# Patient Record
Sex: FEMALE | Race: BLACK OR AFRICAN AMERICAN | NOT HISPANIC OR LATINO | Employment: OTHER | URBAN - METROPOLITAN AREA
[De-identification: names, ages, dates, MRNs, and addresses within clinical notes are randomized per-mention and may not be internally consistent; named-entity substitution may affect disease eponyms.]

---

## 2023-08-21 PROBLEM — G47.10 HYPERSOMNIA: Status: ACTIVE | Noted: 2023-08-21

## 2023-08-21 PROBLEM — G47.9 SLEEP DISORDER: Status: ACTIVE | Noted: 2023-08-21

## 2023-08-21 PROBLEM — R55 SYNCOPE AND COLLAPSE: Status: ACTIVE | Noted: 2023-08-21

## 2023-08-21 PROBLEM — R00.2 HEART PALPITATIONS: Status: ACTIVE | Noted: 2023-08-21

## 2023-08-21 PROBLEM — M54.16 LUMBAR RADICULOPATHY, ACUTE: Status: ACTIVE | Noted: 2023-08-21

## 2023-08-21 PROBLEM — M79.7 FIBROMYALGIA: Status: ACTIVE | Noted: 2023-08-21

## 2023-08-21 PROBLEM — S39.012A STRAIN OF LUMBAR REGION: Status: ACTIVE | Noted: 2023-08-21

## 2023-08-21 PROBLEM — R53.82 CHRONIC FATIGUE: Status: ACTIVE | Noted: 2023-08-21

## 2023-08-21 PROBLEM — M25.50 ARTHRALGIA: Status: ACTIVE | Noted: 2023-08-21

## 2023-08-21 PROBLEM — N18.4 ANEMIA ASSOCIATED WITH STAGE 4 CHRONIC RENAL FAILURE (MULTI): Status: ACTIVE | Noted: 2023-08-21

## 2023-08-21 PROBLEM — I47.10 PAROXYSMAL SVT (SUPRAVENTRICULAR TACHYCARDIA) (CMS-HCC): Status: ACTIVE | Noted: 2023-08-21

## 2023-08-21 PROBLEM — G35 MULTIPLE SCLEROSIS (MULTI): Status: ACTIVE | Noted: 2023-08-21

## 2023-08-21 PROBLEM — S16.1XXA STRAIN OF NECK MUSCLE: Status: ACTIVE | Noted: 2023-08-21

## 2023-08-21 PROBLEM — R26.89 ANTALGIC GAIT: Status: ACTIVE | Noted: 2023-08-21

## 2023-08-21 PROBLEM — G47.33 OSA ON CPAP: Status: ACTIVE | Noted: 2023-08-21

## 2023-08-21 PROBLEM — D63.1 ANEMIA ASSOCIATED WITH STAGE 4 CHRONIC RENAL FAILURE (MULTI): Status: ACTIVE | Noted: 2023-08-21

## 2023-08-21 PROBLEM — R07.89 ATYPICAL CHEST PAIN: Status: ACTIVE | Noted: 2023-08-21

## 2023-08-21 PROBLEM — R79.89 TROPONIN LEVEL ELEVATED: Status: ACTIVE | Noted: 2023-08-21

## 2023-08-21 PROBLEM — I10 BENIGN ESSENTIAL HTN: Status: ACTIVE | Noted: 2023-08-21

## 2023-08-21 PROBLEM — G47.00 INSOMNIA: Status: ACTIVE | Noted: 2023-08-21

## 2023-08-21 RX ORDER — MULTIVITAMIN
1 TABLET ORAL DAILY
COMMUNITY
Start: 2018-01-19

## 2023-08-21 RX ORDER — DULOXETIN HYDROCHLORIDE 60 MG/1
60 CAPSULE, DELAYED RELEASE ORAL
COMMUNITY
Start: 2016-03-28

## 2023-08-21 RX ORDER — HYDROCORTISONE 25 MG/ML
LOTION TOPICAL
COMMUNITY
Start: 2022-04-28 | End: 2024-06-10 | Stop reason: WASHOUT

## 2023-08-21 RX ORDER — LIDOCAINE 50 MG/G
PATCH TOPICAL
COMMUNITY
Start: 2022-08-05

## 2023-08-21 RX ORDER — FLUTICASONE PROPIONATE 50 MCG
SPRAY, SUSPENSION (ML) NASAL
COMMUNITY
Start: 2016-03-28 | End: 2024-06-10 | Stop reason: SDUPTHER

## 2023-08-21 RX ORDER — BETHANECHOL CHLORIDE 50 MG/1
50 TABLET ORAL
COMMUNITY
Start: 2016-04-19

## 2023-08-21 RX ORDER — VALSARTAN 40 MG/1
1 TABLET ORAL 2 TIMES DAILY
COMMUNITY
Start: 2019-10-10

## 2023-08-21 RX ORDER — IBUPROFEN 600 MG/1
TABLET ORAL
COMMUNITY
Start: 2016-09-21

## 2023-08-21 RX ORDER — CLINDAMYCIN PHOSPHATE 10 UG/ML
1 LOTION TOPICAL 2 TIMES DAILY
COMMUNITY
Start: 2018-07-09

## 2023-08-21 RX ORDER — IBANDRONATE SODIUM 150 MG/1
1 TABLET, FILM COATED ORAL
COMMUNITY
Start: 2018-05-04 | End: 2023-09-13

## 2023-08-21 RX ORDER — DICLOFENAC SODIUM 1 MG/ML
SOLUTION/ DROPS OPHTHALMIC
COMMUNITY
Start: 2016-04-23 | End: 2024-06-10 | Stop reason: WASHOUT

## 2023-08-21 RX ORDER — CHLORTHALIDONE 25 MG/1
1 TABLET ORAL DAILY
COMMUNITY
Start: 2019-10-10 | End: 2023-11-14

## 2023-08-21 RX ORDER — CICLOPIROX 80 MG/ML
SOLUTION TOPICAL
COMMUNITY
Start: 2022-08-02

## 2023-08-21 RX ORDER — CYCLOSPORINE 0.5 MG/ML
1 EMULSION OPHTHALMIC 2 TIMES DAILY
COMMUNITY

## 2023-08-21 RX ORDER — ATORVASTATIN CALCIUM 10 MG/1
10 TABLET, FILM COATED ORAL
COMMUNITY
Start: 2016-06-20

## 2023-08-21 RX ORDER — HYDROXYZINE HYDROCHLORIDE 25 MG/1
TABLET, FILM COATED ORAL
COMMUNITY
Start: 2016-08-25

## 2023-08-21 RX ORDER — BACLOFEN 20 MG/1
1 TABLET ORAL 3 TIMES DAILY
COMMUNITY
Start: 2016-03-07 | End: 2024-04-10

## 2023-08-21 RX ORDER — DIMETHYL FUMARATE 240 MG/1
1 CAPSULE ORAL 2 TIMES DAILY
COMMUNITY
Start: 2020-11-25 | End: 2023-10-16 | Stop reason: SDUPTHER

## 2023-08-21 RX ORDER — GABAPENTIN 100 MG/1
1 CAPSULE ORAL 3 TIMES DAILY
COMMUNITY
Start: 2018-12-05

## 2023-08-21 RX ORDER — TRAZODONE HYDROCHLORIDE 50 MG/1
50 TABLET ORAL NIGHTLY PRN
COMMUNITY
Start: 2023-04-17 | End: 2023-11-13

## 2023-08-21 RX ORDER — TIZANIDINE 4 MG/1
1 TABLET ORAL 3 TIMES DAILY
COMMUNITY
Start: 2018-01-19

## 2023-08-21 RX ORDER — MULTIVITAMIN
1 TABLET ORAL DAILY
COMMUNITY
End: 2023-08-21 | Stop reason: SDUPTHER

## 2023-08-21 RX ORDER — ASPIRIN 81 MG/1
81 TABLET ORAL
COMMUNITY

## 2023-08-21 RX ORDER — PANTOPRAZOLE SODIUM 40 MG/1
40 TABLET, DELAYED RELEASE ORAL
COMMUNITY

## 2023-09-13 PROBLEM — L21.8 OTHER SEBORRHEIC DERMATITIS: Status: ACTIVE | Noted: 2023-06-12

## 2023-09-13 PROBLEM — L65.0 TELOGEN EFFLUVIUM: Status: ACTIVE | Noted: 2023-06-12

## 2023-09-13 PROBLEM — D48.5 NEOPLASM OF UNCERTAIN BEHAVIOR OF SKIN: Status: ACTIVE | Noted: 2023-06-12

## 2023-09-13 PROBLEM — R10.9 ABDOMINAL PAIN: Status: ACTIVE | Noted: 2023-09-13

## 2023-09-13 PROBLEM — L82.1 OTHER SEBORRHEIC KERATOSIS: Status: ACTIVE | Noted: 2023-06-12

## 2023-09-13 PROBLEM — D22.5 MELANOCYTIC NEVI OF TRUNK: Status: ACTIVE | Noted: 2023-06-12

## 2023-09-13 PROBLEM — Z77.29 CARBON MONOXIDE EXPOSURE: Status: ACTIVE | Noted: 2023-09-13

## 2023-09-13 PROBLEM — G89.29 CHRONIC BACK PAIN: Status: ACTIVE | Noted: 2023-09-13

## 2023-09-13 PROBLEM — M54.9 CHRONIC BACK PAIN: Status: ACTIVE | Noted: 2023-09-13

## 2023-09-13 PROBLEM — T81.43XA POSTOPERATIVE INTRA-ABDOMINAL ABSCESS: Status: ACTIVE | Noted: 2023-09-13

## 2023-09-13 PROBLEM — R06.02 SHORTNESS OF BREATH ON EXERTION: Status: ACTIVE | Noted: 2023-09-13

## 2023-09-13 PROBLEM — T07.XXXA CONTUSION OF MULTIPLE SITES: Status: ACTIVE | Noted: 2023-09-13

## 2023-09-13 PROBLEM — S92.401A FRACTURE OF PHALANX OF RIGHT GREAT TOE: Status: ACTIVE | Noted: 2023-09-13

## 2023-09-13 PROBLEM — H92.09 OTALGIA: Status: ACTIVE | Noted: 2023-09-13

## 2023-09-13 PROBLEM — K65.1 POSTOPERATIVE INTRA-ABDOMINAL ABSCESS: Status: ACTIVE | Noted: 2023-09-13

## 2023-09-13 PROBLEM — S09.90XA CLOSED HEAD INJURY: Status: ACTIVE | Noted: 2023-09-13

## 2023-09-13 PROBLEM — L03.113 CELLULITIS OF RIGHT ARM: Status: ACTIVE | Noted: 2023-09-13

## 2023-09-13 PROBLEM — R07.9 CHEST PAIN: Status: ACTIVE | Noted: 2017-08-15

## 2023-09-13 PROBLEM — J06.9 ACUTE UPPER RESPIRATORY INFECTION: Status: ACTIVE | Noted: 2023-09-13

## 2023-09-13 PROBLEM — L91.8 OTHER HYPERTROPHIC DISORDERS OF THE SKIN: Status: ACTIVE | Noted: 2023-06-12

## 2023-09-13 PROBLEM — J02.9 PHARYNGITIS: Status: ACTIVE | Noted: 2023-09-13

## 2023-09-13 PROBLEM — H66.91 RIGHT OTITIS MEDIA: Status: ACTIVE | Noted: 2023-09-13

## 2023-09-13 PROBLEM — L81.0 POSTINFLAMMATORY HYPERPIGMENTATION: Status: ACTIVE | Noted: 2023-06-12

## 2023-09-13 PROBLEM — S82.891A FRACTURE OF RIGHT ANKLE: Status: ACTIVE | Noted: 2023-09-13

## 2023-09-13 RX ORDER — MAG HYDROX/ALUMINUM HYD/SIMETH 200-200-20
SUSPENSION, ORAL (FINAL DOSE FORM) ORAL
COMMUNITY
Start: 2022-02-01

## 2023-09-13 RX ORDER — BIMATOPROST 3 UG/ML
1 SOLUTION TOPICAL
COMMUNITY
Start: 2023-06-12 | End: 2023-11-13 | Stop reason: SDUPTHER

## 2023-09-13 RX ORDER — ERGOCALCIFEROL 1.25 MG/1
1 CAPSULE ORAL
COMMUNITY

## 2023-09-13 RX ORDER — KETOCONAZOLE 20 MG/G
CREAM TOPICAL
COMMUNITY
Start: 2020-12-21 | End: 2023-11-13 | Stop reason: SDUPTHER

## 2023-09-13 RX ORDER — PROCHLORPERAZINE MALEATE 10 MG
1 TABLET ORAL EVERY 6 HOURS PRN
COMMUNITY
Start: 2022-02-15 | End: 2023-11-15 | Stop reason: ALTCHOICE

## 2023-09-13 RX ORDER — TRIAMCINOLONE ACETONIDE 1 MG/G
1 CREAM TOPICAL
COMMUNITY
Start: 2018-07-09 | End: 2024-06-10 | Stop reason: WASHOUT

## 2023-09-13 RX ORDER — ONDANSETRON 4 MG/1
4 TABLET, ORALLY DISINTEGRATING ORAL 3 TIMES DAILY PRN
COMMUNITY
Start: 2018-07-04 | End: 2023-11-15 | Stop reason: ALTCHOICE

## 2023-09-13 RX ORDER — POTASSIUM CHLORIDE 20 MEQ/1
1 TABLET, EXTENDED RELEASE ORAL DAILY
COMMUNITY
Start: 2022-05-23

## 2023-09-13 RX ORDER — MODAFINIL 100 MG/1
1 TABLET ORAL DAILY
COMMUNITY
Start: 2020-04-28 | End: 2024-06-10 | Stop reason: WASHOUT

## 2023-09-13 RX ORDER — MINOXIDIL 50 MG/G
1 AEROSOL, FOAM TOPICAL
COMMUNITY
Start: 2020-12-21 | End: 2023-11-13 | Stop reason: SDUPTHER

## 2023-09-13 RX ORDER — CAPSAICIN 0.75 MG/G
1 CREAM TOPICAL
COMMUNITY
Start: 2020-12-21

## 2023-09-13 RX ORDER — KETOCONAZOLE 20 MG/ML
1 SHAMPOO, SUSPENSION TOPICAL
COMMUNITY
Start: 2020-08-03 | End: 2023-11-13 | Stop reason: SDUPTHER

## 2023-09-13 RX ORDER — CYCLOSPORINE 0.5 MG/ML
1 EMULSION OPHTHALMIC 2 TIMES DAILY
COMMUNITY
Start: 2019-12-23 | End: 2023-11-15 | Stop reason: WASHOUT

## 2023-09-13 RX ORDER — SIMETHICONE 125 MG
1 TABLET,CHEWABLE ORAL EVERY 6 HOURS PRN
COMMUNITY
Start: 2022-03-23

## 2023-09-13 RX ORDER — DEXAMETHASONE 4 MG/1
4 TABLET ORAL 2 TIMES DAILY
COMMUNITY
Start: 2022-01-12 | End: 2024-06-10 | Stop reason: WASHOUT

## 2023-09-13 RX ORDER — LOPERAMIDE HYDROCHLORIDE 2 MG/1
CAPSULE ORAL
COMMUNITY
Start: 2022-02-15 | End: 2024-06-10 | Stop reason: WASHOUT

## 2023-09-13 RX ORDER — TRAMADOL HYDROCHLORIDE 50 MG/1
50 TABLET ORAL EVERY 8 HOURS PRN
COMMUNITY
Start: 2017-12-09

## 2023-09-13 RX ORDER — ONDANSETRON HYDROCHLORIDE 8 MG/1
8 TABLET, FILM COATED ORAL EVERY 8 HOURS PRN
COMMUNITY
Start: 2022-02-15 | End: 2023-11-15 | Stop reason: ALTCHOICE

## 2023-09-13 RX ORDER — LOSARTAN POTASSIUM 50 MG/1
50 TABLET ORAL 2 TIMES DAILY
COMMUNITY
End: 2023-10-17 | Stop reason: ALTCHOICE

## 2023-10-12 ENCOUNTER — HOSPITAL ENCOUNTER (OUTPATIENT)
Dept: RADIOLOGY | Facility: HOSPITAL | Age: 58
Discharge: HOME | End: 2023-10-12
Payer: MEDICARE

## 2023-10-12 DIAGNOSIS — R07.89 OTHER CHEST PAIN: ICD-10-CM

## 2023-10-12 PROCEDURE — 75571 CT HRT W/O DYE W/CA TEST: CPT

## 2023-10-16 ENCOUNTER — OFFICE VISIT (OUTPATIENT)
Dept: NEUROLOGY | Facility: CLINIC | Age: 58
End: 2023-10-16
Payer: MEDICARE

## 2023-10-16 VITALS
DIASTOLIC BLOOD PRESSURE: 73 MMHG | WEIGHT: 174 LBS | SYSTOLIC BLOOD PRESSURE: 112 MMHG | HEIGHT: 65 IN | TEMPERATURE: 97.1 F | BODY MASS INDEX: 28.99 KG/M2

## 2023-10-16 DIAGNOSIS — S39.012D STRAIN OF LUMBAR REGION, SUBSEQUENT ENCOUNTER: ICD-10-CM

## 2023-10-16 DIAGNOSIS — F51.01 PRIMARY INSOMNIA: ICD-10-CM

## 2023-10-16 DIAGNOSIS — T45.1X5A CHEMOTHERAPY-INDUCED NEUROPATHY (MULTI): ICD-10-CM

## 2023-10-16 DIAGNOSIS — G35 MULTIPLE SCLEROSIS (MULTI): Primary | ICD-10-CM

## 2023-10-16 DIAGNOSIS — G62.0 CHEMOTHERAPY-INDUCED NEUROPATHY (MULTI): ICD-10-CM

## 2023-10-16 DIAGNOSIS — G47.33 OSA ON CPAP: ICD-10-CM

## 2023-10-16 DIAGNOSIS — S16.1XXD STRAIN OF NECK MUSCLE, SUBSEQUENT ENCOUNTER: ICD-10-CM

## 2023-10-16 PROCEDURE — 3078F DIAST BP <80 MM HG: CPT | Performed by: PSYCHIATRY & NEUROLOGY

## 2023-10-16 PROCEDURE — 99214 OFFICE O/P EST MOD 30 MIN: CPT | Performed by: PSYCHIATRY & NEUROLOGY

## 2023-10-16 PROCEDURE — 1036F TOBACCO NON-USER: CPT | Performed by: PSYCHIATRY & NEUROLOGY

## 2023-10-16 PROCEDURE — 3074F SYST BP LT 130 MM HG: CPT | Performed by: PSYCHIATRY & NEUROLOGY

## 2023-10-16 RX ORDER — DIMETHYL FUMARATE 240 MG/1
240 CAPSULE ORAL 2 TIMES DAILY
Qty: 180 CAPSULE | Refills: 3 | Status: SHIPPED | OUTPATIENT
Start: 2023-10-16 | End: 2023-11-30 | Stop reason: SDUPTHER
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
## 2023-10-17 ENCOUNTER — OFFICE VISIT (OUTPATIENT)
Dept: CARDIOLOGY | Facility: CLINIC | Age: 58
End: 2023-10-17
Payer: MEDICARE

## 2023-10-17 VITALS
OXYGEN SATURATION: 98 % | WEIGHT: 170 LBS | HEIGHT: 64 IN | BODY MASS INDEX: 29.02 KG/M2 | DIASTOLIC BLOOD PRESSURE: 60 MMHG | SYSTOLIC BLOOD PRESSURE: 98 MMHG | HEART RATE: 80 BPM

## 2023-10-17 DIAGNOSIS — R06.02 SHORTNESS OF BREATH ON EXERTION: ICD-10-CM

## 2023-10-17 DIAGNOSIS — I10 BENIGN ESSENTIAL HTN: ICD-10-CM

## 2023-10-17 DIAGNOSIS — R07.9 CHEST PAIN, UNSPECIFIED TYPE: ICD-10-CM

## 2023-10-17 DIAGNOSIS — I47.10 PAROXYSMAL SVT (SUPRAVENTRICULAR TACHYCARDIA) (CMS-HCC): Primary | ICD-10-CM

## 2023-10-17 DIAGNOSIS — R00.2 HEART PALPITATIONS: ICD-10-CM

## 2023-10-17 PROCEDURE — 99214 OFFICE O/P EST MOD 30 MIN: CPT | Performed by: STUDENT IN AN ORGANIZED HEALTH CARE EDUCATION/TRAINING PROGRAM

## 2023-10-17 PROCEDURE — 1036F TOBACCO NON-USER: CPT | Performed by: STUDENT IN AN ORGANIZED HEALTH CARE EDUCATION/TRAINING PROGRAM

## 2023-10-17 PROCEDURE — 3074F SYST BP LT 130 MM HG: CPT | Performed by: STUDENT IN AN ORGANIZED HEALTH CARE EDUCATION/TRAINING PROGRAM

## 2023-10-17 PROCEDURE — 3078F DIAST BP <80 MM HG: CPT | Performed by: STUDENT IN AN ORGANIZED HEALTH CARE EDUCATION/TRAINING PROGRAM

## 2023-10-17 RX ORDER — IBANDRONATE SODIUM 150 MG/1
150 TABLET, FILM COATED ORAL
COMMUNITY

## 2023-10-17 NOTE — PATIENT INSTRUCTIONS
We will follow-up on your CT calcium score results.    We discussed that if your blood pressure continues to run low, I would consider holding or reducing chlorthalidone to half a tablet or 12.5 mg daily.    Please continue remaining cardiac medications including aspirin 81 mg, atorvastatin 10 mg daily, chlorthalidone 25 mg daily, valsartan 40 mg twice daily, propranolol 80 mg daily.    Please followup with me in Cardiology clinic within the next 9 months.  Please return to clinic sooner or seek emergent care if your symptoms reoccur or worsen.

## 2023-10-17 NOTE — PROGRESS NOTES
Follow-up (6 mo)     HPI:    Genny Grant is a 58 y.o. female with pertinent history of family history of premature coronary artery disease, paroxysmal supraventricular tachycardia, palpitations, hypertension, dyslipidemia, multiple sclerosis, breast cancer with recent chemotherapy and upcoming radiation, history of syncopal episode, low normal ejection fraction with impaired relaxation on echo performed 9/17/2021, no clear ischemia on nuclear stress test performed 8/15/2017 or 1/31/2020, coronary calcium score of 0.4 9/23/2021, preserved ejection fraction with impaired relaxation echo performed 10/19/2022, no clear ischemia nuclear stress test performed 3/15/2023 presents for follow-up.    She is doing relatively well.  She does note some occasional dizziness.  Her blood pressure was a bit low today.  She continues to have some sporadic nonexertional chest discomfort.  No exacerbating or relieving factors.  Pt denies orthopnea, and paroxysmal nocturnal dyspnea.  Pt denies worsening lower extremity edema.  Pt denies palpitations or syncope.  No recent falls.  No fever or chills.  No cough.  No change in bowel or bladder habits.  No sick contacts.  No recent travel.    12 point review of systems including (Constitutional, Eyes, ENMT, Respiratory, Cardiac, Gastrointestinal, Neurological, Psychiatric, and Hematologic) was performed and is otherwise negative.    Past medical history reviewed:   has a past medical history of Hypersomnia, unspecified (09/10/2019), Multiple sclerosis (CMS/HCC), Personal history of other endocrine, nutritional and metabolic disease, and Personal history of other specified conditions (01/04/2018).    Past surgical history reviewed    Social history reviewed:   reports that she has never smoked. She has never used smokeless tobacco. She reports that she does not drink alcohol. No history on file for drug use.     Family history reviewed:    Family History   Problem Relation Name Age  of Onset    Alzheimer's disease Mother      Other (Malignant neoplasm) Mother      Other (Malignant neoplasm) Father         Allergies reviewed: Adhesive, Diphenhydramine, and Penicillins     Medications reviewed:   Current Outpatient Medications   Medication Instructions    aspirin 81 mg, oral    atorvastatin (LIPITOR) 10 mg, oral    baclofen (Lioresal) 20 mg tablet 1 tablet, oral, 3 times daily    bethanechol (URECHOLINE) 50 mg, oral    bimatoprost (Latisse) 0.03 % ophthalmic solution 1 Application, DO PER DIRECTED    capsicum (Zostrix) 0.075 % topical cream 1 Application, Topical, PER DIRECTED    chlorthalidone (Hygroton) 25 mg tablet 1 tablet, oral, Daily    ciclopirox (Penlac) 8 % solution APPLY TO AFFECTED AREA DAILY AT BEDTIME. REMOVE FILM FROM MEDICATION ONCE WEEKLY WITH ALCOHOL SWAB    clindamycin (Cleocin T) 1 % lotion 1 Application, Topical, 2 times daily, apply 1 application twice a day topically to red bumps on skin    cycloSPORINE (Restasis MultiDose) 0.05 % drops 1 drop, Both Eyes, 2 times daily    cycloSPORINE (Restasis) 0.05 % ophthalmic emulsion 1 drop, ophthalmic (eye), 2 times daily    dexAMETHasone (DECADRON) 4 mg, oral, 2 times daily, TAKE 2 TAB TWICE DAILY FOR 3 DAYS STARTING 1 DAY PRIOR TO TAXOTERE  PER DIRECTED    diclofenac (Voltaren) 0.1 % ophthalmic solution ophthalmic (eye)    dimethyl fumarate (TECFIDERA) 240 mg, oral, 2 times daily    DULoxetine (CYMBALTA) 60 mg, oral    ergocalciferol (Vitamin D-2) 1.25 MG (43706 UT) capsule 1 capsule, oral, Weekly    fluticasone (Flonase) 50 mcg/actuation nasal spray nasal    gabapentin (Neurontin) 100 mg capsule 1 capsule, oral, 3 times daily    hydrocortisone 1 % ointment Topical, DO PER DIRECTED    hydrocortisone 2.5 % lotion Topical    hydrOXYzine HCL (Atarax) 25 mg tablet oral    ibuprofen 600 mg tablet oral    ketoconazole (NIZOral) 2 % cream Topical, DO PER DIRECTED    ketoconazole (NIZOral) 2 % shampoo 1 Application, Topical, PER DIRECTED     lidocaine (Lidoderm) 5 % patch APPLY 1 PATCH TO INTACT SKIN REMOVE AFTER 12 HOURS EXTERNALLY ONCE A DAY FOR 30 DAYS    loperamide (Imodium) 2 mg capsule oral, SEE DETAILS ATTACHED    losartan (COZAAR) 50 mg, oral, 2 times daily    minoxidiL 5 % foam 1 Application, Topical, DO PER DIRECTED    modafinil (Provigil) 100 mg tablet 1 tablet, oral, Daily    multivitamin tablet 1 tablet, oral, Daily    ondansetron (ZOFRAN) 8 mg, oral, Every 8 hours PRN    ondansetron ODT (ZOFRAN-ODT) 4 mg, oral, 3 times daily PRN, PER DIRECTED    pantoprazole (PROTONIX) 40 mg, oral    potassium chloride CR (Klor-Con M20) 20 mEq ER tablet 1 tablet, oral, Daily    prochlorperazine (Compazine) 10 mg tablet 1 tablet, oral, Every 6 hours PRN    propranolol XL (Innopran XL) 80 mg 24 hr capsule 1 capsule, oral, Daily    simethicone (Mylicon) 125 mg chewable tablet 1 tablet, oral, Every 6 hours PRN    tiZANidine (Zanaflex) 4 mg tablet 1 tablet, oral, 3 times daily, TAKE 1 TABLET BY MOUTH 3 TIMES DAILY AS NEEDED FOR BACK AND LEG CRAMPS    traMADol (ULTRAM) 50 mg, oral, Every 8 hours PRN    traZODone (DESYREL) 50 mg, oral, Nightly PRN, TAKE 1 TABLET AT BEDTIME AS NEEDED FOR SLEEP. IF NO HELP IN 2 DAYS, TAKE 2 AT BEDTIME    triamcinolone (Kenalog) 0.1 % cream 1 Application, Topical, PER DIRECTED    valsartan (Diovan) 40 mg tablet 1 tablet, oral, 2 times daily        Vitals reviewed: Blood pressure 98/60 mmHg    Heart rate 80 bpm, pulse ox 98%.    Physical Exam:   General:  Patient is awake, alert, and oriented.  Patient is in no acute distress.  HEENT:  Pupils equal and reactive.  Normocephalic.  Moist mucosa.    Neck:  No thyromegaly.  Normal Jugular Venous Pressure.  Cardiovascular:  Regular rate and rhythm.  Normal S1 and S2.  1/6 SCARLET.  Pulmonary:  Clear to auscultation bilaterally.  Abdomen:  Soft. Non-tender.   Non-distended.  Positive bowel sounds.  Lower Extremities:  2+ pedal pulses. No LE edema.  Neurologic:  Cranial nerves intact.  No focal  deficit.   Skin: Skin warm and dry, normal skin turgor.   Psychiatric: Normal affect.    Last Labs:  CBC -      Lab Results   Component Value Date    WBC 6.3 11/18/2021    HGB 13.8 11/18/2021    HCT 41.1 11/18/2021     11/18/2021        CMP-  Lab Results   Component Value Date    GLUCOSE 101 (H) 06/22/2022     06/22/2022    K 3.7 06/22/2022    CL 99 06/22/2022    CO2 32 06/22/2022    ANIONGAP 11 06/22/2022    BUN 16 06/22/2022    CREATININE 0.69 06/22/2022    CALCIUM 9.8 06/22/2022    PROT 7.3 06/22/2022    ALBUMIN 4.2 06/22/2022    AST 13 06/22/2022    ALT 18 06/22/2022    ALKPHOS 83 06/22/2022    BILITOT 1.3 (H) 06/22/2022        LIPIDS-  Lab Results   Component Value Date    CHOL 143 11/18/2021    TRIG 75 11/18/2021    HDL 45.1 11/18/2021    CHHDL 3.2 11/18/2021    VLDL 15 11/18/2021        OTHERS-  Lab Results   Component Value Date    HGBA1C 6.1 (H) 05/26/2022        I personally reviewed the patient's recent vitals, labs, medications, orders, EKGs, pertinent cardiac imaging/ echocardiography and ischemic evaluations including stress testing/ cardiac catheterization.    Assessment and Plan:    Genny Grant is a 58 y.o. female with pertinent history of family history of premature coronary artery disease, paroxysmal supraventricular tachycardia, palpitations, hypertension, dyslipidemia, multiple sclerosis, breast cancer with recent chemotherapy and upcoming radiation, history of syncopal episode, low normal ejection fraction with impaired relaxation on echo performed 9/17/2021, no clear ischemia on nuclear stress test performed 8/15/2017 or 1/31/2020, coronary calcium score of 0.4 9/23/2021, preserved ejection fraction with impaired relaxation echo performed 10/19/2022, no clear ischemia nuclear stress test performed 3/15/2023 presents for follow-up.  She is doing relatively well.  She does note some occasional dizziness.  Her blood pressure was a bit low today.  She continues to have some  sporadic nonexertional chest discomfort.      We will follow-up on your CT calcium score results.    We discussed that if your blood pressure continues to run low, I would consider holding or reducing chlorthalidone to half a tablet or 12.5 mg daily.    Please continue remaining cardiac medications including aspirin 81 mg, atorvastatin 10 mg daily, chlorthalidone 25 mg daily, valsartan 40 mg twice daily, propranolol 80 mg daily.    Please followup with me in Cardiology clinic within the next 9 months.  Please return to clinic sooner or seek emergent care if your symptoms reoccur or worsen.    Thank you for allowing me to participate in their care.  Please feel free to call me with any further questions or concerns.        Amrit Campbell MD, FAC, [facility]  Division of Cardiovascular Medicine  Medical Director, [facility]  [facility]  Assistant Clinical Professor, Medicine  [employer] [facility] [employer]  [email or web address]  Office:  [phone]

## 2023-11-06 ENCOUNTER — ANCILLARY PROCEDURE (OUTPATIENT)
Dept: RADIOLOGY | Facility: CLINIC | Age: 58
End: 2023-11-06
Payer: MEDICARE

## 2023-11-06 ENCOUNTER — TELEPHONE (OUTPATIENT)
Dept: NEUROLOGY | Facility: CLINIC | Age: 58
End: 2023-11-06
Payer: MEDICARE

## 2023-11-06 DIAGNOSIS — G35 MULTIPLE SCLEROSIS (MULTI): Primary | ICD-10-CM

## 2023-11-06 DIAGNOSIS — G35 MULTIPLE SCLEROSIS (MULTI): ICD-10-CM

## 2023-11-06 PROCEDURE — 70553 MRI BRAIN STEM W/O & W/DYE: CPT | Performed by: RADIOLOGY

## 2023-11-06 PROCEDURE — 70553 MRI BRAIN STEM W/O & W/DYE: CPT

## 2023-11-06 PROCEDURE — 2550000001 HC RX 255 CONTRASTS: Performed by: PSYCHIATRY & NEUROLOGY

## 2023-11-06 PROCEDURE — A9575 INJ GADOTERATE MEGLUMI 0.1ML: HCPCS | Performed by: PSYCHIATRY & NEUROLOGY

## 2023-11-06 RX ORDER — GADOTERATE MEGLUMINE 376.9 MG/ML
15 INJECTION INTRAVENOUS
Status: COMPLETED | OUTPATIENT
Start: 2023-11-06 | End: 2023-11-06

## 2023-11-06 RX ADMIN — GADOTERATE MEGLUMINE 15 ML: 376.9 INJECTION INTRAVENOUS at 11:59

## 2023-11-08 ENCOUNTER — HOME HEALTH ADMISSION (OUTPATIENT)
Dept: HOME HEALTH SERVICES | Facility: HOME HEALTH | Age: 58
End: 2023-11-08
Payer: MEDICARE

## 2023-11-08 ENCOUNTER — HOME INFUSION (OUTPATIENT)
Dept: INFUSION THERAPY | Age: 58
End: 2023-11-08
Payer: MEDICARE

## 2023-11-08 DIAGNOSIS — G35 MULTIPLE SCLEROSIS (MULTI): Primary | ICD-10-CM

## 2023-11-08 RX ORDER — METHYLPREDNISOLONE SODIUM SUCCINATE 1 G/16ML
1000 INJECTION INTRAMUSCULAR; INTRAVENOUS DAILY
Qty: 3000 MG | Refills: 0 | Status: SHIPPED
Start: 2023-11-08 | End: 2024-06-10 | Stop reason: WASHOUT

## 2023-11-09 ENCOUNTER — HOME INFUSION (OUTPATIENT)
Dept: INFUSION THERAPY | Age: 58
End: 2023-11-09
Payer: MEDICARE

## 2023-11-09 NOTE — PROGRESS NOTES
REVIEWED PT INFO AS CORRECT.   DX...  MS  REVIEWED ALLERGIES...   DIPENHYDRAMINE, PCN. ADHESIVE  NO MEDICATION INTERACTIONS.  REVIEWED RELEVANT BASELINE VALUES  LAB ARE ...none ordered  PT HAS **peripheral infusion**FLUSH PER [facility] PROTOCOL.  CONTINUE MED THRU TENTATIVE STOP DATE …. DOSING …METHYLPRED 1GMIV DAILY X3 CONSECUTIVE DAYS - 11.13-11.15  MED PLACED …gravity bag, homemix  CARE PLAN DONE TODAY    Patient is a    57yo patient coming on service with [facility] for treatment of MS flare....patient has been ordered IV methyprednisolone 1000 mg q24h x3 days by Dr Youngblood   Patient will receive this infusion peripherally    Pt rep to contact pt to set up delivery  Sending straight delivery …with all supplies on fridy 11.10.23  3x Methyprednisolone 1 gm vials    3x Bacteriostatic water vials   3x Nacl 0.9% 250 ml infusion bags  DOS….11.13- 11.15      No follow up. Next delivery pending orders/ needs

## 2023-11-10 ENCOUNTER — DOCUMENTATION (OUTPATIENT)
Dept: PHARMACY | Facility: CLINIC | Age: 58
End: 2023-11-10

## 2023-11-10 NOTE — PROGRESS NOTES
CALLED PT AND LEFT VM - DELIVERY IS SCHEDULED FOR TODAY BY  9 PM.  LET PT KNOW WE'RE SENDING STND SUPPLIES AND ASKED TO CALL W/ ANY QUESTIONS.

## 2023-11-11 DIAGNOSIS — I10 BENIGN ESSENTIAL HTN: Primary | ICD-10-CM

## 2023-11-11 DIAGNOSIS — F51.01 PRIMARY INSOMNIA: Primary | ICD-10-CM

## 2023-11-13 ENCOUNTER — OFFICE VISIT (OUTPATIENT)
Dept: DERMATOLOGY | Facility: HOSPITAL | Age: 58
End: 2023-11-13
Payer: MEDICARE

## 2023-11-13 ENCOUNTER — HOME CARE VISIT (OUTPATIENT)
Dept: HOME HEALTH SERVICES | Facility: HOME HEALTH | Age: 58
End: 2023-11-13
Payer: MEDICARE

## 2023-11-13 VITALS — WEIGHT: 72 LBS | RESPIRATION RATE: 16 BRPM | HEIGHT: 65 IN | BODY MASS INDEX: 12 KG/M2

## 2023-11-13 DIAGNOSIS — B02.29 POST HERPETIC NEURALGIA: ICD-10-CM

## 2023-11-13 DIAGNOSIS — L81.0 POST-INFLAMMATORY HYPERPIGMENTATION: ICD-10-CM

## 2023-11-13 DIAGNOSIS — L65.8 FEMALE PATTERN ALOPECIA: ICD-10-CM

## 2023-11-13 DIAGNOSIS — L21.9 SEBORRHEIC DERMATITIS: Primary | ICD-10-CM

## 2023-11-13 DIAGNOSIS — L91.8 SKIN TAG: ICD-10-CM

## 2023-11-13 DIAGNOSIS — L65.9 ALOPECIA: ICD-10-CM

## 2023-11-13 DIAGNOSIS — R20.9 DISTURBANCE OF SKIN SENSATION: ICD-10-CM

## 2023-11-13 PROBLEM — L82.1 OTHER SEBORRHEIC KERATOSIS: Status: RESOLVED | Noted: 2023-06-12 | Resolved: 2023-11-13

## 2023-11-13 PROBLEM — D22.5 MELANOCYTIC NEVI OF TRUNK: Status: RESOLVED | Noted: 2023-06-12 | Resolved: 2023-11-13

## 2023-11-13 PROCEDURE — 1090000002 HH PPS REVENUE DEBIT

## 2023-11-13 PROCEDURE — 0023 HH SOC

## 2023-11-13 PROCEDURE — 3074F SYST BP LT 130 MM HG: CPT | Performed by: DERMATOLOGY

## 2023-11-13 PROCEDURE — 169592 NO-PAY CLAIM PROCEDURE

## 2023-11-13 PROCEDURE — 1036F TOBACCO NON-USER: CPT | Performed by: DERMATOLOGY

## 2023-11-13 PROCEDURE — G0299 HHS/HOSPICE OF RN EA 15 MIN: HCPCS | Mod: HHH

## 2023-11-13 PROCEDURE — 99214 OFFICE O/P EST MOD 30 MIN: CPT | Mod: 25 | Performed by: DERMATOLOGY

## 2023-11-13 PROCEDURE — 99602 HOME NFS VISIT EACH ADDL HR: CPT | Mod: HHH

## 2023-11-13 PROCEDURE — 99214 OFFICE O/P EST MOD 30 MIN: CPT | Performed by: DERMATOLOGY

## 2023-11-13 PROCEDURE — 3078F DIAST BP <80 MM HG: CPT | Performed by: DERMATOLOGY

## 2023-11-13 PROCEDURE — 1090000001 HH PPS REVENUE CREDIT

## 2023-11-13 RX ORDER — MINOXIDIL 50 MG/G
1 AEROSOL, FOAM TOPICAL DAILY
Qty: 60 G | Refills: 11 | Status: SHIPPED | OUTPATIENT
Start: 2023-11-13 | End: 2024-06-10 | Stop reason: WASHOUT

## 2023-11-13 RX ORDER — BIMATOPROST 3 UG/ML
SOLUTION TOPICAL
Qty: 5 ML | Refills: 3 | Status: SHIPPED | OUTPATIENT
Start: 2023-11-13

## 2023-11-13 RX ORDER — TRAZODONE HYDROCHLORIDE 50 MG/1
100 TABLET ORAL NIGHTLY
Qty: 60 TABLET | Refills: 5 | Status: SHIPPED | OUTPATIENT
Start: 2023-11-13 | End: 2024-06-10

## 2023-11-13 RX ORDER — TRETINOIN 0.25 MG/G
CREAM TOPICAL NIGHTLY
Qty: 45 G | Refills: 3 | Status: SHIPPED | OUTPATIENT
Start: 2023-11-13 | End: 2024-06-10 | Stop reason: WASHOUT

## 2023-11-13 RX ORDER — KETOCONAZOLE 20 MG/ML
SHAMPOO, SUSPENSION TOPICAL
Qty: 120 ML | Refills: 11 | Status: SHIPPED | OUTPATIENT
Start: 2023-11-13 | End: 2024-02-12 | Stop reason: SDUPTHER

## 2023-11-13 ASSESSMENT — DERMATOLOGY PATIENT ASSESSMENT
ARE YOU TRYING TO GET PREGNANT: NO
HAVE YOU HAD OR DO YOU HAVE A STAPH INFECTION: NO
ARE YOU AN ORGAN TRANSPLANT RECIPIENT: NO
DO YOU USE A TANNING BED: NO
HAVE YOU HAD OR DO YOU HAVE VASCULAR DISEASE: NO
ARE YOU ON BIRTH CONTROL: NO
DO YOU HAVE ANY NEW OR CHANGING LESIONS: NO
DO YOU HAVE IRREGULAR MENSTRUAL CYCLES: NO

## 2023-11-13 ASSESSMENT — PAIN SCALES - PAIN ASSESSMENT IN ADVANCED DEMENTIA (PAINAD)
BODYLANGUAGE: 0
NEGVOCALIZATION: 0 - NONE.
BREATHING: 0
BODYLANGUAGE: 0 - RELAXED.
CONSOLABILITY: 0
FACIALEXPRESSION: 0 - SMILING OR INEXPRESSIVE.
NEGVOCALIZATION: 0
TOTALSCORE: 0
FACIALEXPRESSION: 0
CONSOLABILITY: 0 - NO NEED TO CONSOLE.

## 2023-11-13 ASSESSMENT — DERMATOLOGY QUALITY OF LIFE (QOL) ASSESSMENT
RATE HOW BOTHERED YOU ARE BY EFFECTS OF YOUR SKIN PROBLEMS ON YOUR ACTIVITIES (EG, GOING OUT, ACCOMPLISHING WHAT YOU WANT, WORK ACTIVITIES OR YOUR RELATIONSHIPS WITH OTHERS): 0 - NEVER BOTHERED
RATE HOW EMOTIONALLY BOTHERED YOU ARE BY YOUR SKIN PROBLEM (FOR EXAMPLE, WORRY, EMBARRASSMENT, FRUSTRATION): 0 - NEVER BOTHERED
DATE THE QUALITY-OF-LIFE ASSESSMENT WAS COMPLETED: 66791
ARE THERE EXCLUSIONS OR EXCEPTIONS FOR THE QUALITY OF LIFE ASSESSMENT: NO
RATE HOW BOTHERED YOU ARE BY SYMPTOMS OF YOUR SKIN PROBLEM (EG, ITCHING, STINGING BURNING, HURTING OR SKIN IRRITATION): 0 - NEVER BOTHERED

## 2023-11-13 ASSESSMENT — PATIENT GLOBAL ASSESSMENT (PGA): PATIENT GLOBAL ASSESSMENT: PATIENT GLOBAL ASSESSMENT:  1 - CLEAR

## 2023-11-13 ASSESSMENT — ENCOUNTER SYMPTOMS
HIGHEST PAIN SEVERITY IN PAST 24 HOURS: 3/10
LOSS OF SENSATION IN FEET: 1
PAIN: 1
OCCASIONAL FEELINGS OF UNSTEADINESS: 1
APPETITE LEVEL: GOOD
PAIN SEVERITY GOAL: 0/10
LOWEST PAIN SEVERITY IN PAST 24 HOURS: 2/10
LAST BOWEL MOVEMENT: 66789

## 2023-11-13 ASSESSMENT — ITCH NUMERIC RATING SCALE: HOW SEVERE IS YOUR ITCHING?: 2

## 2023-11-13 NOTE — PROGRESS NOTES
Subjective     Genny Grant is a 58 y.o. female who presents for the following: Alopecia.     Review of Systems:  No other skin or systemic complaints other than what is documented elsewhere in the note.    The following portions of the chart were reviewed this encounter and updated as appropriate:   Tobacco  Allergies  Meds  Problems  Med Hx  Surg Hx         Skin Cancer History  No skin cancer on file.      Specialty Problems          Dermatology Problems    Neoplasm of uncertain behavior of skin    Other seborrheic dermatitis    Telogen effluvium    Contusion of multiple sites        Objective   Well appearing patient in no apparent distress; mood and affect are within normal limits.    A focused skin examination was performed. All findings within normal limits unless otherwise noted below.    Assessment/Plan   1. Seborrheic dermatitis  Scalp  No scale on exam    Needs refills of ketoconazole shampoo    Related Procedures  Follow Up In Dermatology - Established Patient    2. Female pattern alopecia  Scalp  Diffuse decreased density over crown of scalp. Pull test normal, no erythmea          Treat seb derm as recommended, this will minimize inflammation and make it easier for hair to grow    Recommend minoxidil 5% foam    ketoconazole (NIZOral) 2 % shampoo - Scalp  Apply topically 1 (one) time per week.    minoxidiL 5 % foam - Scalp  Apply 1 Application topically once daily.    3. Post-inflammatory hyperpigmentation  Head - Anterior (Face)  Brown patches, irregular, on face    Did not get tretinoin from last visit. Print rx today. Warned that she may need to use GoodRx coupon    tretinoin (Retin-A) 0.025 % cream - Head - Anterior (Face)  Apply topically once daily at bedtime. A pea-sized amount to the whole face, start every 2-3 nights and gradually increase to nightly    4. Alopecia  Left Eyebrow  Decreased hair density of eyebrows    Noticed after chemotherapy; not yet at full thickness, may be  age-related changes as well    She did not receive bimatoprost when last prescribed; warned that she will likely have to pay out of pocket    Related Medications  bimatoprost (Latisse) 0.03 % ophthalmic solution  Apply daily to areas of decreased hair on eyebrows    5. Skin tag  Neck - Anterior  Flesh colored pedunculated papule(s); x10 around neck. They cause pain    -Discussed nature of the condition  -Reassurance  -Removal may be performed   - Will check with insurance to see if covered    Prior Authorization for Skin Excision - Skin Tag Removal - Neck - Anterior    6. Disturbance of skin sensation (3)  Neck - Anterior, Scalp (2)  No skin lesions    Associated with skin tags    Related Procedures  Prior Authorization for Skin Excision - Skin Tag Removal    7. Post herpetic neuralgia  Scalp  No skin lesions    At last visit Rx capsaicin cream, she cannot recall if she received it or not    Continue gabapentin per neurology        Follow up 3 months for hair/pigmentation  Follow up for skin tag removal - wait for PA to determine how to schedule

## 2023-11-13 NOTE — PATIENT INSTRUCTIONS
Minoxidil / Rogaine 5% foam  3 pack from [employer] / [employer] / [employer] / [employer] for $30  Use it once per day

## 2023-11-14 ENCOUNTER — HOME CARE VISIT (OUTPATIENT)
Dept: HOME HEALTH SERVICES | Facility: HOME HEALTH | Age: 58
End: 2023-11-14
Payer: MEDICARE

## 2023-11-14 VITALS
WEIGHT: 172 LBS | OXYGEN SATURATION: 97 % | BODY MASS INDEX: 28.62 KG/M2 | HEART RATE: 75 BPM | TEMPERATURE: 98.5 F | DIASTOLIC BLOOD PRESSURE: 78 MMHG | SYSTOLIC BLOOD PRESSURE: 104 MMHG | RESPIRATION RATE: 18 BRPM

## 2023-11-14 PROCEDURE — 1090000001 HH PPS REVENUE CREDIT

## 2023-11-14 PROCEDURE — G0299 HHS/HOSPICE OF RN EA 15 MIN: HCPCS | Mod: HHH

## 2023-11-14 PROCEDURE — 1090000002 HH PPS REVENUE DEBIT

## 2023-11-14 RX ORDER — CHLORTHALIDONE 25 MG/1
25 TABLET ORAL DAILY
Qty: 90 TABLET | Refills: 3 | Status: SHIPPED | OUTPATIENT
Start: 2023-11-14

## 2023-11-14 ASSESSMENT — ENCOUNTER SYMPTOMS
DEPRESSION: 0
APPETITE LEVEL: POOR
HIGHEST PAIN SEVERITY IN PAST 24 HOURS: 0/10
PERSON REPORTING PAIN: PATIENT
DENIES PAIN: 1
OCCASIONAL FEELINGS OF UNSTEADINESS: 1
LOSS OF SENSATION IN FEET: 1
CHANGE IN APPETITE: VARYING

## 2023-11-14 ASSESSMENT — PAIN SCALES - PAIN ASSESSMENT IN ADVANCED DEMENTIA (PAINAD)
FACIALEXPRESSION: 0
NEGVOCALIZATION: 0 - NONE.
FACIALEXPRESSION: 0 - SMILING OR INEXPRESSIVE.
CONSOLABILITY: 0 - NO NEED TO CONSOLE.
BREATHING: 0
NEGVOCALIZATION: 0
CONSOLABILITY: 0
BODYLANGUAGE: 0 - RELAXED.
TOTALSCORE: 0
BODYLANGUAGE: 0

## 2023-11-15 ENCOUNTER — OFFICE VISIT (OUTPATIENT)
Dept: PULMONOLOGY | Facility: HOSPITAL | Age: 58
End: 2023-11-15
Payer: MEDICARE

## 2023-11-15 ENCOUNTER — HOME CARE VISIT (OUTPATIENT)
Dept: HOME HEALTH SERVICES | Facility: HOME HEALTH | Age: 58
End: 2023-11-15
Payer: MEDICARE

## 2023-11-15 VITALS
TEMPERATURE: 97.2 F | SYSTOLIC BLOOD PRESSURE: 138 MMHG | RESPIRATION RATE: 18 BRPM | OXYGEN SATURATION: 100 % | DIASTOLIC BLOOD PRESSURE: 83 MMHG | HEART RATE: 64 BPM

## 2023-11-15 VITALS
HEART RATE: 77 BPM | HEIGHT: 65 IN | WEIGHT: 171.96 LBS | OXYGEN SATURATION: 98 % | RESPIRATION RATE: 18 BRPM | SYSTOLIC BLOOD PRESSURE: 130 MMHG | BODY MASS INDEX: 28.65 KG/M2 | DIASTOLIC BLOOD PRESSURE: 78 MMHG

## 2023-11-15 DIAGNOSIS — R06.02 SHORTNESS OF BREATH: Primary | ICD-10-CM

## 2023-11-15 DIAGNOSIS — G47.33 OSA ON CPAP: ICD-10-CM

## 2023-11-15 PROCEDURE — 99214 OFFICE O/P EST MOD 30 MIN: CPT | Performed by: NURSE PRACTITIONER

## 2023-11-15 PROCEDURE — G0299 HHS/HOSPICE OF RN EA 15 MIN: HCPCS | Mod: HHH

## 2023-11-15 PROCEDURE — 1090000002 HH PPS REVENUE DEBIT

## 2023-11-15 PROCEDURE — 3075F SYST BP GE 130 - 139MM HG: CPT | Performed by: NURSE PRACTITIONER

## 2023-11-15 PROCEDURE — 99204 OFFICE O/P NEW MOD 45 MIN: CPT | Performed by: NURSE PRACTITIONER

## 2023-11-15 PROCEDURE — 3079F DIAST BP 80-89 MM HG: CPT | Performed by: NURSE PRACTITIONER

## 2023-11-15 PROCEDURE — 1090000001 HH PPS REVENUE CREDIT

## 2023-11-15 PROCEDURE — 1036F TOBACCO NON-USER: CPT | Performed by: NURSE PRACTITIONER

## 2023-11-15 RX ORDER — LORATADINE 10 MG/1
10 TABLET ORAL DAILY
COMMUNITY

## 2023-11-15 ASSESSMENT — PAIN SCALES - PAIN ASSESSMENT IN ADVANCED DEMENTIA (PAINAD)
NEGVOCALIZATION: 0
FACIALEXPRESSION: 0 - SMILING OR INEXPRESSIVE.
BODYLANGUAGE: 0 - RELAXED.
BODYLANGUAGE: 0
FACIALEXPRESSION: 0
NEGVOCALIZATION: 0 - NONE.
TOTALSCORE: 0
CONSOLABILITY: 0
CONSOLABILITY: 0 - NO NEED TO CONSOLE.
BREATHING: 0

## 2023-11-15 ASSESSMENT — ENCOUNTER SYMPTOMS
NAUSEA: 1
AGITATION: 0
NAUSEA: 1
NERVOUS/ANXIOUS: 0
ARTHRALGIAS: 1
SINUS PRESSURE: 0
FEVER: 0
PALPITATIONS: 1
NUMBNESS: 1
HIGHEST PAIN SEVERITY IN PAST 24 HOURS: 0/10
SUBJECTIVE PAIN PROGRESSION: WAXING AND WANING
DIZZINESS: 0
WEAKNESS: 0
RHINORRHEA: 0
FATIGUE: 1
VOICE CHANGE: 0
JOINT SWELLING: 0
MYALGIAS: 0
HEADACHES: 1
DENIES PAIN: 1
DIARRHEA: 0
BACK PAIN: 1
EYE PAIN: 0
ABDOMINAL PAIN: 1
VOMITING: 0

## 2023-11-15 ASSESSMENT — ACTIVITIES OF DAILY LIVING (ADL)
HOME_HEALTH_OASIS: 01
OASIS_M1830: 01

## 2023-11-15 ASSESSMENT — PAIN SCALES - GENERAL: PAINLEVEL: 0-NO PAIN

## 2023-11-15 NOTE — PROGRESS NOTES
Patient: Genny Grant    [id number]  : 1965 -- AGE 58 y.o.    Provider: CARMINA Parsons     Location [facility]   Service Date: 11/15/2023              [facility]  New Visit Note      HISTORY OF PRESENT ILLNESS     The patient's referring provider is: No ref. provider found    PCP: Dr. Cardenas  Oncology: Dr. Wood - [facility]   Neurology: Dr. Youngblood   Cardiology: Dr. Campbell    HISTORY OF PRESENT ILLNESS   Genny Grant is a 58 y.o. female who presents to a [facility] for an evaluation with concerns of New Patient Visit. I have independently interviewed and examined the patient in the office and reviewed available records.    Current History    She was previously seen by Dr. Gharibeh. She has SOB with lying flat especially at night. She states this has been going on over the last year. She has episodes of SOB at rest at times as well. She does do exercises. She denies any cough. She states she was told recently that she was wheezing, but she states she has not heard it herself.  She is currently living with her son -- had [employer]. She had an episode when she was living with her daughter -  had episode of ?syncope for which they had to call EMS (? Needed narcan). She does feel she has some MATUTE with her exercises. She has not been doing her exercises recently related to MS flare. She does have some dry throat and other times mucous in her throat. She has had a runny nose recently.  She uses flonase. She states the post nasal drip she has had for some time. She feels the flonase and nasal saline is helpful - uses them daily. She takes loratadine daily - states if her allergies act up she will have sinus pressure. She states she has had some sinus pressure with the runny nose recently. She denies any fever/ chills. She has had bothersome GERD symptoms - despite taking pantoprazole daily.   She takes it at night before bed. She states lately it has been bad -- has not seen GI for this. She feels it is worse since she has been on there steroid infusions for her MS. She has some chest burning -- she feels related to her reflux.  Denies chest pain. She has tried tums PRN, but that has not been overly helpful. She has history of palpitations -- sees Dr. Campbell with Cardiology. She states she has had LE swelling over the last few weeks and her PCP just recently started her on lasix with improvement of her swelling, but she is not sure it has helped with her breathing. She feels she is able to walk easier, but feels that is related to her steroid infusions she is on for her MS flare currently.      Previous pulmonary history: She has no history of recurrent infections, or lung disease as a child.  Shee had no previous lung hx, never on oxygen or inhaler therapy.     Inhalers/nebulized medications: none     Hospitalization History: She has not been hospitalized over the last year for breathing related problem.    Sleep history: 2019 sleep study negative for DWIGHT.       ALLERGIES AND MEDICATIONS     ALLERGIES  Allergies   Allergen Reactions    Adhesive Unknown    Diphenhydramine Unknown    Penicillins Unknown       MEDICATIONS  Current Outpatient Medications   Medication Sig Dispense Refill    aspirin 81 mg EC tablet Take 1 tablet (81 mg) by mouth.      atorvastatin (Lipitor) 10 mg tablet Take 1 tablet (10 mg) by mouth.      baclofen (Lioresal) 20 mg tablet Take 1 tablet (20 mg) by mouth 3 times a day.      bethanechol (Urecholine) 50 mg tablet Take 1 tablet (50 mg) by mouth.      bimatoprost (Latisse) 0.03 % ophthalmic solution Apply daily to areas of decreased hair on eyebrows 5 mL 3    capsicum (Zostrix) 0.075 % topical cream Apply 1 Application topically. PER DIRECTED      chlorthalidone (Hygroton) 25 mg tablet TAKE 1 TABLET BY MOUTH EVERY DAY 90 tablet 3    ciclopirox (Penlac) 8 % solution APPLY TO  AFFECTED AREA DAILY AT BEDTIME. REMOVE FILM FROM MEDICATION ONCE WEEKLY WITH ALCOHOL SWAB      clindamycin (Cleocin T) 1 % lotion Apply 1 Application topically 2 times a day. apply 1 application twice a day topically to red bumps on skin      cycloSPORINE (Restasis MultiDose) 0.05 % drops Administer 1 drop into both eyes 2 times a day.      dexAMETHasone (Decadron) 4 mg tablet Take 1 tablet (4 mg) by mouth 2 times a day. TAKE 2 TAB TWICE DAILY FOR 3 DAYS STARTING 1 DAY PRIOR TO TAXOTERE  PER DIRECTED      diclofenac (Voltaren) 0.1 % ophthalmic solution Administer into affected eye(s).      dimethyl fumarate (Tecfidera) 240 mg capsule,delayed release(DR/EC) capsule Take 1 capsule (240 mg) by mouth 2 times a day. 180 capsule 3    DULoxetine (Cymbalta) 60 mg DR capsule Take 1 capsule (60 mg) by mouth.      ergocalciferol (Vitamin D-2) 1.25 MG (11845 UT) capsule Take 1 capsule (1,250 mcg) by mouth 1 (one) time per week.      fluticasone (Flonase) 50 mcg/actuation nasal spray Administer into affected nostril(s).      gabapentin (Neurontin) 100 mg capsule Take 1 capsule (100 mg) by mouth 3 times a day.      hydrocortisone 1 % ointment Apply topically. DO PER DIRECTED      hydrocortisone 2.5 % lotion Apply topically.      hydrOXYzine HCL (Atarax) 25 mg tablet Take by mouth.      ibandronate (Boniva) 150 mg tablet Take 1 tablet (150 mg) by mouth every 30 (thirty) days. Take in morning with full glass of water on an empty stomach. No food, drink, meds, or lying down for 60 minutes after.      ibuprofen 600 mg tablet Take by mouth.      ketoconazole (NIZOral) 2 % shampoo Apply topically 1 (one) time per week. 120 mL 11    lidocaine (Lidoderm) 5 % patch APPLY 1 PATCH TO INTACT SKIN REMOVE AFTER 12 HOURS EXTERNALLY ONCE A DAY FOR 30 DAYS      loperamide (Imodium) 2 mg capsule Take by mouth. SEE DETAILS ATTACHED      methylPREDNISolone sodium succinate, PF, (SOLU-Medrol) 1,000 mg injection Infuse 1,000 mg into a venous catheter  once daily. Infuse for 3 consecutive days. Dispense with 250ml NaCl for infusion and bacteriostatic water to reconstitute. Infuse per peripheral access. Dx: MS 3000 mg 0    minoxidiL 5 % foam Apply 1 Application topically once daily. 60 g 11    modafinil (Provigil) 100 mg tablet Take 1 tablet (100 mg) by mouth once daily.      multivitamin tablet Take 1 tablet by mouth once daily.      pantoprazole (ProtoNix) 40 mg EC tablet Take 1 tablet (40 mg) by mouth.      potassium chloride CR (Klor-Con M20) 20 mEq ER tablet Take 1 tablet (20 mEq) by mouth once daily.      propranolol XL (Innopran XL) 80 mg 24 hr capsule Take 1 capsule (80 mg) by mouth once daily.      simethicone (Mylicon) 125 mg chewable tablet Chew 1 tablet (125 mg) every 6 hours if needed.      tiZANidine (Zanaflex) 4 mg tablet Take 1 tablet (4 mg) by mouth 3 times a day. TAKE 1 TABLET BY MOUTH 3 TIMES DAILY AS NEEDED FOR BACK AND LEG CRAMPS      traMADol (Ultram) 50 mg tablet Take 1 tablet (50 mg) by mouth every 8 hours if needed.      traZODone (Desyrel) 50 mg tablet Take 2 tablets (100 mg) by mouth once daily at bedtime. 60 tablet 5    tretinoin (Retin-A) 0.025 % cream Apply topically once daily at bedtime. A pea-sized amount to the whole face, start every 2-3 nights and gradually increase to nightly 45 g 3    triamcinolone (Kenalog) 0.1 % cream Apply 1 Application topically. PER DIRECTED      valsartan (Diovan) 40 mg tablet Take 1 tablet (40 mg) by mouth 2 times a day.       No current facility-administered medications for this visit.         PAST HISTORY     PAST MEDICAL HISTORY  - MS - (diagnosed 20 years ago) currently on home methylprednisolone infusions -  - Stage IIA R Breast Ca T2N0M0 ER/TX- and HER2- dx 5/2005 (lumpectomy).  S/p  lumpectomy (12/29/21), XRT and 6 cycles of Epirubicin/Cytoxan. S/p  Cyclophosphamide and Docetaxel on 1/27/2022. Developed severe dermatitis from docetaxel switched to CMF (2/24/2022 - 7/21/2022). S/p adjuvant  radiation completed in 12/2022 - right chest port inplace   - SVT   - HTN   - HLD   - fibromyalgia   - seasonal allergies   - dry eyes   - GERD   - ? RA - seen by rheumatology - RF normal 2019. Never on medications     PAST SURGICAL HISTORY  - lumpectomy x 2   - tubes tied -- scar tissue removal   - ovarian cysts removed     IMMUNIZATION HISTORY  Immunization History   Administered Date(s) Administered    Flu vaccine (IIV4), preservative free *Check age/dose* 10/21/2017, 09/14/2019    Flu vaccine, quadrivalent, recombinant, preservative free, adult (FLUBLOK) 11/11/2020    Influenza, seasonal, injectable 10/03/2014    Pfizer COVID-19 vaccine, bivalent, age 12 years and older (30 mcg/0.3 mL) 01/14/2023    Pfizer Purple Cap SARS-CoV-2 04/24/2021, 05/15/2021    Pneumococcal polysaccharide vaccine, 23-valent, age 2 years and older (PNEUMOVAX 23) 10/03/2014    Td vaccine, age 7 years and older (TDVAX) 04/23/2014    Zoster vaccine, recombinant, adult (SHINGRIX) 12/04/2021, 01/14/2023       SOCIAL HISTORY  Smoking: never   Alcohol: none   Illicit drugs:  none     OCCUPATIONAL/ENVIRONMENTAL HISTORY  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     FAMILY HISTORY  Son - COPD       RESULTS/DATA     Pulmonary Function Test Results       None on record     Chest Radiograph     CHEST 2 VIEW 07/03/2018 - No acute cardiopulmonary process.      No orders to display        Chest CT Scan     Ca scoring - 10/15/23 - 1. Coronary artery calcium score of 0*. 2. Findings which can be associated with hepatic steatosis. Correlate with LFTs. 3. Findings in the bilateral breasts as described above. Correlate with any available outside hospital mammogram studies.       Echocardiogram     Echo: 10/19/22 - Left ventricular systolic function is normal with a 60-65% estimated ejection fraction.   2. Spectral Doppler shows an impaired relaxation pattern of left ventricular diastolic filling.   3. There is low normal right ventricular  systolic function.   4. Left ventricular cavity size is decreased.  RA normal size, RV normal size and function     Nuclear stress test: 3/15/23 - Normal stress myocardial perfusion imaging in response to  pharmacologic stress in the setting of breast attenuation artifact.  2. Well-maintained left ventricular function.      Other testing/ Labs      REVIEW OF SYSTEMS     REVIEW OF SYSTEMS  Review of Systems   Constitutional:  Positive for fatigue. Negative for fever.   HENT:  Positive for congestion and postnasal drip. Negative for rhinorrhea, sinus pressure and voice change.    Eyes:  Positive for visual disturbance. Negative for pain.   Cardiovascular:  Positive for palpitations and leg swelling. Negative for chest pain.   Gastrointestinal:  Positive for abdominal pain and nausea. Negative for diarrhea and vomiting.   Endocrine: Positive for cold intolerance. Negative for heat intolerance.   Musculoskeletal:  Positive for arthralgias and back pain. Negative for joint swelling and myalgias.   Skin:  Negative for rash.   Neurological:  Positive for numbness and headaches. Negative for dizziness and weakness.   Psychiatric/Behavioral:  Negative for agitation. The patient is not nervous/anxious.          PHYSICAL EXAM     VITAL SIGNS: There were no vitals taken for this visit.     CURRENT WEIGHT: [unfilled]  BMI: [unfilled]  PREVIOUS WEIGHTS:  Wt Readings from Last 3 Encounters:   11/14/23 78 kg (172 lb)   11/13/23 (!) 32.7 kg (72 lb)   10/17/23 77.1 kg (170 lb)       Physical Exam  Vitals reviewed.   Constitutional:       General: She is not in acute distress.     Appearance: Normal appearance. She is not ill-appearing or toxic-appearing.   HENT:      Head: Normocephalic.      Nose: No rhinorrhea.   Cardiovascular:      Rate and Rhythm: Normal rate and regular rhythm.      Heart sounds: Normal heart sounds.   Pulmonary:      Effort: Pulmonary effort is normal. No respiratory distress.      Breath sounds: Normal breath  sounds. No stridor.   Abdominal:      General: Abdomen is flat.   Musculoskeletal:         General: No swelling. Normal range of motion.   Skin:     General: Skin is warm and dry.      Nails: There is no clubbing.   Neurological:      General: No focal deficit present.      Mental Status: She is alert.   Psychiatric:         Mood and Affect: Mood normal.         Behavior: Behavior normal.         Judgment: Judgment normal.         ASSESSMENT/PLAN        Shortness of breath: episodic. Happening when she is lying flat. Recent MS flare. Recently started on lasix by her PCP. Improvement in swelling, but has not noticed an improvement in her breathing.    - make sure Dr. Campbell knows about the shortness of breath lying down and LE swelling   - will get baseline breathing tests     2. Snoring: sleep study previously negative - CPAP in the past   - referral to sleep medicine

## 2023-11-15 NOTE — PATIENT INSTRUCTIONS
Shortness of breath: episodic. Happening when she is lying flat. Recent MS flare. Recently started on lasix by her PCP. Improvement in swelling, but has not noticed an improvement in her breathing.    - make sure Dr. Campbell knows about the shortness of breath lying down and LE swelling   - will get baseline breathing tests     2. Snoring: sleep study previously negative - CPAP in the past   - referral to sleep medicine       Thank you for visiting the Pulmonary clinic today!   Return to clinic  1-2 months with breathing tests or sooner if needed   Gissell Paiz CNP  My office number is [phone]  Stephani is my  and Suri is my nurse.   Radiology scheduling [phone]   Appointment scheduling [phone]

## 2023-11-28 ASSESSMENT — ACTIVITIES OF DAILY LIVING (ADL): OASIS_M1830: 03

## 2023-11-29 PROCEDURE — G0180 MD CERTIFICATION HHA PATIENT: HCPCS | Performed by: PSYCHIATRY & NEUROLOGY

## 2023-11-30 DIAGNOSIS — G35 MULTIPLE SCLEROSIS (MULTI): ICD-10-CM

## 2023-12-01 DIAGNOSIS — G35 MULTIPLE SCLEROSIS (MULTI): ICD-10-CM

## 2023-12-03 RX ORDER — DIMETHYL FUMARATE 240 MG/1
240 CAPSULE ORAL 2 TIMES DAILY
Qty: 180 CAPSULE | Refills: 3 | Status: SHIPPED | OUTPATIENT
Start: 2023-12-03 | End: 2023-12-05 | Stop reason: SDUPTHER

## 2023-12-05 RX ORDER — DIMETHYL FUMARATE 240 MG/1
240 CAPSULE ORAL 2 TIMES DAILY
Qty: 180 CAPSULE | Refills: 3 | Status: SHIPPED | OUTPATIENT
Start: 2023-12-05 | End: 2024-12-04

## 2023-12-05 RX ORDER — DIMETHYL FUMARATE 240 MG/1
CAPSULE ORAL
Qty: 60 CAPSULE | Refills: 3 | OUTPATIENT
Start: 2023-12-05

## 2024-02-12 ENCOUNTER — OFFICE VISIT (OUTPATIENT)
Dept: DERMATOLOGY | Facility: HOSPITAL | Age: 59
End: 2024-02-12
Payer: MEDICARE

## 2024-02-12 ENCOUNTER — TELEPHONE (OUTPATIENT)
Dept: DERMATOLOGY | Facility: CLINIC | Age: 59
End: 2024-02-12
Payer: MEDICARE

## 2024-02-12 DIAGNOSIS — L82.1 DERMATOSIS PAPULOSA NIGRA: ICD-10-CM

## 2024-02-12 DIAGNOSIS — L21.9 SEBORRHEIC DERMATITIS: Primary | ICD-10-CM

## 2024-02-12 DIAGNOSIS — L65.8 FEMALE PATTERN ALOPECIA: ICD-10-CM

## 2024-02-12 DIAGNOSIS — L29.9 PRURITUS: ICD-10-CM

## 2024-02-12 PROBLEM — D48.5 NEOPLASM OF UNCERTAIN BEHAVIOR OF SKIN: Status: RESOLVED | Noted: 2023-06-12 | Resolved: 2024-02-12

## 2024-02-12 PROCEDURE — 99213 OFFICE O/P EST LOW 20 MIN: CPT | Performed by: DERMATOLOGY

## 2024-02-12 PROCEDURE — 1036F TOBACCO NON-USER: CPT | Performed by: DERMATOLOGY

## 2024-02-12 RX ORDER — KETOCONAZOLE 20 MG/ML
SHAMPOO, SUSPENSION TOPICAL
Qty: 120 ML | Refills: 11 | Status: SHIPPED | OUTPATIENT
Start: 2024-02-12
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
## 2024-02-12 NOTE — TELEPHONE ENCOUNTER
Pt left message that she received a call from Dr Sexton office , I returned call to pt she said Dr Sexton name came up on her phone she does not know what the call is for ?

## 2024-02-12 NOTE — PATIENT INSTRUCTIONS
Dry Skin    Cymbalta and gabapentin can sometimes help with itching  If the below skin care is not enough, you may want to either restart your cymbalta or start taking the gabapentin more regulalry. That may help with the itching      Dry skin can occur at any age and for many reasons. In general, skin becomes drier as we age. It is drier in the winter months than in summer months, and drier in low-humidity climates than in high humidity climates. Skin looks and feels dry because it lacks water. However, your skin’s natural oils are necessary to prevent it from drying out. Our goal is to replace the oil in order to keep the water in your skin.    In some people, areas of seriously dry skin can lead to a condition called dermatitis in which the skin becomes inflamed. When dermatitis is present, your dermatologist may prescribe a corticosteroid cream or ointment. This cream is applied to the affected areas only. Discontinue the corticosteroid cream when the dermatitis clears. The use of a moisturizing lotion, cream, or ointment should be continued to help prevent recurrence of the dermatitis.    Avoid hot baths and showers -- hot water removes your natural skin oils more quickly.  Keep showers or bath brief (5-10 minutes).  Use a mild soap or cleanser (bar or liquid body wash):  Dove  - body    Cetaphil - face  Vanicream  Aveeno  Oil of Olay     If you have eczema or sensitive skin, look for formulas that are for “sensitive skin” or “fragrance-free.” “Unscented” products may still contain perfumes.  If your skin is extra-dry, you may only need to use soap daily to the underarms and groin. Use soap to the whole body only 2-3 times weekly.  When you get out of the bath or shower, pat skin dry.  Use the “3-Minute Rule” after you pat yourself dry: apply a moisturizer within 3 minutes of getting out of the bath.  Good moisturizers:  CeraVe    Aveeno    Petroleum jelly (Vaseline)  Cetaphil    Vegetable oil     Aquaphor  Nutraderm    Olive oil    Mineral oil  Eucerin   Neutrogena Norwegian Formula    *Note:  In general, creams and ointments are better moisturizers than lotions.    You may need to reapply moisturizers 2-4 times a day.

## 2024-03-27 NOTE — PROGRESS NOTES
"       [facility]  [facility]  [id number]  [facility] [id number] [phone]     [facility]  New Visit Note      Subjective   Patient ID: Genny Grant is a 59 y.o. female with past medical history significant for Overweight, Hypertension, SVT, Palpitations, Obstructive sleep apnea, Insomnia, Fibromyalgia, and multiple sclerosis.      4/3/2024: The patient is here {pxarrival:84704} and was referred by [unfilled] for comprehensive sleep medicine evaluation due to {SleepCC:21671}. ESS: IBIS:  , and neck circumference is  inches  today.    HPI  {OSAhx:00981}      SLEEP STUDY HISTORY: (personally reviewed raw data such as interpretation report, data sheet, hypnogram, and titration table if available and applicable)  2019: AHI: 1.3/hr, [place]: 89%    SLEEP-WAKE SCHEDULE  Bedtime: *** on weekdays, *** on weekends  Subjective sleep latency: ***  Problems falling asleep: ***  Number of awakenings: *** times per night spontaneously for unknown reasons  Falls back asleep in ***  Problems staying asleep: ***  Final wake time: *** on weekdays, *** on weekends  Out of bed time: *** on weekdays, *** on weekends  Shift work: ***  Naps: ***  Feels rested after a nap: {Yes/No:59809}  Average sleep duration (excluding naps): ***    SLEEP ENVIRONMENT  Sleep location: ***  Sleep status: {sleep status:87227}  Room is dark:  {Yes/No:99506::\"Yes\"}  Room is quiet: {Yes/No:84981::\"Yes\"}  Room is cool: {Yes/No:28393::\"Yes\"}  Bed comfort: good    SLEEP HABITS:   Activities before bedtime: ***  Activities in bed: ***  Preferred sleep position: {Sleep position:84421}    SLEEP ROS:  Night symptoms: {sleep apnea ROS at night:99975}  Morning symptoms: {sleep apnea ROS in mornin}  Daytime symptoms {sleep apnea ROS at daytime:30293}  Hypersomnia / narcolepsy symptoms: {narcolepsy ROS:69569}  RLS symptoms: {RLS " symptoms:44185}  Movements in sleep: {sleep movements:12751}  Parasomnia symptoms: {parasomnia ROS:30503}    WEIGHT: {weight:44537}    REVIEW OF SYSTEMS: All other systems have been reviewed and are negative.    PERTINENT SOCIAL HISTORY:  Occupation: employment status:46677}  Smoking: {Yes/No:62138}  ETOH: {Yes/No:53748}  Marijuana: {Yes/No:07995}  Caffeine: ***  Sleep aids: {Yes/No:47271}  Claustrophobia: {Yes/No:56137}    PERTINENT PAST SURGICAL HISTORY:  {surgical history pertinent in sleep:22754}    PERTINENT FAMILY HISTORY:  {family history in sleep:04899}    Active Problems, Allergy List, Medication List, and PMH/PSH/FH/Social Hx have been reviewed and reconciled in chart. No significant changes unless documented in the pertinent chart section. Updates made when necessary.       Objective     Physical Exam  Constitutional:alert and oriented to time, place, and person  Sinus: *** tenderness to palpation  Palate: Normal / Narrow / High-arched / *** torus palatini  Mallampati ***, *** Tongue scalloping, *** macroglossia  Lungs: Clear to auscultation bilateral, no rales  Heart: Regular rate and rhythm, no murmurs    Assessment/Plan   Genny Grant is a 59 y.o. female who is seen to evaluate for ***possible/severe/ moderate/mild obstructive sleep apnea. The pathophysiology of sleep apnea, diagnostic testing (HST vs PSG), treatment options (PAP, oral appliance, surgery, hypoglossal nerve stimulator called Inspire), and supportive management (weight loss, positional therapy, smoking cessation, avoidance of alcohol and sedatives) were discussed with the patient in detail. Risk factors of sleep apnea as well as cardiometabolic and neurocognitive sequelae associated with untreated sleep apnea were also discussed. Lastly, patient was advised to avoid driving vehicle or operating heavy machinery when sleepy.     Genyn Grant with the following problems:     # SLEEP DISORDERED BREATHING:  -This is likely  sleep apnea based on the the history and physical examination. [unfilled]:   Genny Granthas not yet had a sleep study.  -Instruct patient to complete home/ in lab/ split night/ titration sleep study.  -HSAT is reasonable as patient likely has DWIGHT based on history and exam and does not have any of the following comorbidities: CHF, neuromuscular weakness, hypoventilation, or significant COPD.  -We consider treatment as indicated when testing is complete.     # OBSTRUCTIVE SLEEP APNEA/ CENTRAL SLEEP APNEA/ MIXED SLEEP APNEA:  -Start/ Continue [] cmH20 []PAP through [employer].  -Sleep apnea and PAP therapy education were provided at length in the clinic today. Genny Grant verbalized understanding.  -Emphasized diet, exercise, and weight loss in the clinic, as were non-supine sleep, avoiding alcohol in the late evening, and driving or operating heavy machinery when sleepy.  Genny Grantverbalizes understanding of the above instructions and risks.    # BARIATRIC SURGERY INSTRUCTION:  -Please continuous using your CPAP to treat your sleep apnea.  -Bring your PAP and all equipment with you to surgery.  -Use your PAP with surgery and during recovery.  -Keep your head of the bed at 30* or higher.  -I advise judicious use of pain medications post operatively as pain medications can make sleep apnea worse.    -I advise close monitoring of the patient post operatively due to increased risk of complications related to sleep apnea.   -Genny Grant may be at higher risk of postoperative respiratory complications.Genny Grant understands the risk of post operative complications are higher for Genny Grant is at increased but not prohibitive risk due to DWIGHT.   -Genny Grant is optimized on PAP therapy for sleep apnea as long as Genny Grant is compliant with PAP use per most recent download.  -Genny Isabel  "Rudy verbalizes understanding of the above instructions and risks.     # CHRONIC SLEEP ONSET/ SLEEP MAINTENANCE INSOMNIA:  -likely due to poor sleep hygiene, irregular sleep schedule, depression, anxiety, untreated sleep apnea, nocturia, RLS, delayed sleep phase syndrome, and chronic pain. [Meds] may be a contributing factor as well.  -IBIS:    # DEPRESSION and ANXIETY:   -Genny Kuhn taking [] and participating in psychotherapy.  -Denies HI/SI     # HYPERTENSION/ ATRIAL FIBRILLATION/ CAD/ CHF:  -Blood pressure was controlled today.  -Denies headache, palpitation, and syncope in the clinic.  -Last Echo:  -Follows with PCP/ Cardiology     # MORBID OBESITY/OBESITY /OVERWEIGHT:  -with a BMI of []. Genny Grant most recent Bicarbonate was   Bicarbonate   Date Value Ref Range Status   06/22/2022 32 21 - 32 mmol/L Final      -Encourage to have regular exercise to manage weight well.  -Bariatric surgery candidate.    # NASAL CONGESTION:  -Instruct Genny Langston use appropriate Flonase spray to ease congestion.  -Refer to Otolaryngology for underlying investigation.    # XEROSTOMIA:  -Instruct Genny Langston purchase the Biotene gel to ease the dry mouth symptom,     # TOBACCO ABUSE:Genny Grant is a current [] PPD smoker for [] years.  -Smoking Cessation given  -Decline Smoking Cessation     # RESTLESS LEG SYNDROME: This occurs frequently.  No results found for: \"IRON\", \"TRANSFERRIN\", \"IRONSAT\", \"TIBC\", \"FERRITIN\"  We will check a ferritin level today and start OTC iron replacement to ferritin of >75 as indicated.  Caffeine []. Eliminate  Tobacco []. Smoking cessation counseling provided.  Genny Martinez I discussed Genny Grantcurrent medications that could be exacerbating Genny Grant symptoms. Will continue [] for now.  Associated diseases [] and response " [].  Pramipexole  Ropinirole  Rotigitine  Gabapentin  Pregabalin  Opioids  Benzos     # CIRCADIAN RHYTHM SLEEP-WAKE DISORDER:  -We will obtain sleep logs for two weeks to be brought to next clinic to discuss. We will consider actigraphy to compare and/or confirm sleep log findings.  Delayed Sleep Phase:   -Set wake time, light therapy in the morning.   -Sleep hygiene measures at set bedtime.  Advanced Sleep Phase:   -Set bedtime and light therapy in the evening.   -Sleep hygiene measures at set bedtime.  Irregular Sleep Phase:  -Set bedtime and wake time.   -Daytime routine including scheduled physical activity, social activity and meal times.  Non-24 Sleep-Wake Rhythm:  -Set bedtime and wake time.   -Daytime routine including scheduled physical activity, social activity and meal times.  Shift Work:  -Maximize sleep time to 7-8 hours.   -Make sure your room is dark, quiet, cool and interruptions are eliminated.   -Avoid light in the mornings, wear dark sunglasses driving home.   -Expose yourself to bright light or daylight upon waking.   -Avoid caffeine 8 hours prior to sleeping.   -We will consider modafinil, a mild stimulant, if these strategies are not effective.  Jet Lag:  -Try to change your sleep/wake schedule two to three days prior to travel.   -Expose yourself to bright light when you want to be awake.   -Avoid bright light and electronic light when you are nearing time to sleep.   -You can take melatonin OTC as needed 1 hour prior to bedtime as needed.     # SLEEPWALKING/ SLEEP EATING/ REM BEHAVIOR DISORDER:  -Instruct Genny Grant to make sure self and bed partner are safe.  -Instruct Genny Langston lock bedroom doors and windows.  -Instruct Genny Langston hide his/her car keys.  -Instruct Genny Langston pad headboard and move furniture away from the bed.  -Instruct Genny Grant to put pillows in between him/her with bed partner if kicking or hitting.  Consider sleeping separately.  -Instruct Genny Grant to remove clutter and furniture from bedroom floor to avoid injury. Consider moving Genny MORALES Chalo Grant mattress to the floor.  -Instruct Genny Grant to advise bed partner to calmly lead you back to bed with a gentle voice. Avoid touching and grabbing.  -Instruct Genny M Chalo Grant to  find evidence of sleep eating, especially if he/she is gaining weight, consider locking Genny MORALES Chalo Grant fridge and pantry at night.  -We will consider a trial of low dose clonazepam 0.5 mg nightly.     #IDIOPATHIC HYPERSOMNIA VS. NARCOLEPSY:  - Will order an overnight sleep study, followed by MSLT the next day  - Perform urine toxicology prior to sleep study.  - Will call patient within 3 weeks after the test. Will discuss follow up plan at that time.  - May consider checking TSH and iron studies to rule out underlying metabolic etiologies.  -Scheduled naps  -Consolidate night time sleep.     *An OARRS report was reviewed and is consistent with prescribed medications.   *Considered the risks of abuse, dependence, addiction, and diversion and [] medication is felt to be clinically appropriate based on documented diagnosis.  *A controlled substance agreement was reviewed and signed today in clinic.   *Pending scanned copy in to the chart per office staff.    RTC      All of patient's questions were answered. She verbalizes understanding and agreement with my assessment and plan.

## 2024-04-03 ENCOUNTER — APPOINTMENT (OUTPATIENT)
Dept: SLEEP MEDICINE | Facility: HOSPITAL | Age: 59
End: 2024-04-03
Payer: MEDICARE

## 2024-04-09 DIAGNOSIS — G35 MULTIPLE SCLEROSIS (MULTI): Primary | ICD-10-CM

## 2024-04-10 RX ORDER — BACLOFEN 20 MG/1
20 TABLET ORAL 3 TIMES DAILY
Qty: 90 TABLET | Refills: 11 | Status: SHIPPED | OUTPATIENT
Start: 2024-04-10

## 2024-04-15 ENCOUNTER — OFFICE VISIT (OUTPATIENT)
Dept: NEUROLOGY | Facility: CLINIC | Age: 59
End: 2024-04-15
Payer: MEDICARE

## 2024-04-15 VITALS
HEIGHT: 64 IN | SYSTOLIC BLOOD PRESSURE: 100 MMHG | HEART RATE: 66 BPM | WEIGHT: 160 LBS | DIASTOLIC BLOOD PRESSURE: 75 MMHG | TEMPERATURE: 96.6 F | BODY MASS INDEX: 27.31 KG/M2

## 2024-04-15 DIAGNOSIS — G35 MULTIPLE SCLEROSIS (MULTI): Primary | ICD-10-CM

## 2024-04-15 PROCEDURE — 99213 OFFICE O/P EST LOW 20 MIN: CPT | Performed by: PSYCHIATRY & NEUROLOGY

## 2024-04-15 PROCEDURE — 3078F DIAST BP <80 MM HG: CPT | Performed by: PSYCHIATRY & NEUROLOGY

## 2024-04-15 PROCEDURE — 3074F SYST BP LT 130 MM HG: CPT | Performed by: PSYCHIATRY & NEUROLOGY

## 2024-04-15 PROCEDURE — 1036F TOBACCO NON-USER: CPT | Performed by: PSYCHIATRY & NEUROLOGY

## 2024-04-15 NOTE — PROGRESS NOTES
NEUROLOGY OUTPATIENT FOLLOW-UP NOTE    Assessment/Plan   Diagnoses and all orders for this visit:  Multiple sclerosis (Multi)  -     Cane      IMPRESSION:  Stable multiple sclerosis.  Non-neurological hip and SI joint pain likely referable to arthritis.    PLAN:  Continue dimethyl fumarate daily.  Advised to cut tizanidine in half to avoid drowsiness, and will continue baclofen.  Advised to follow up with her PCP regarding the joint pain.  I will see her again in six months or prn.      Phani Youngblood Jr., M.D., FAAN   ----------    Subjective     Genny ANDREW Grant is a 59 y.o. year old female here for follow-up.    Cramps in both legs.  Takes tizanidine, which helps, but causes drowsiness.  She reports hip and back pain that limit her walking.  Reports symptoms of fibromyalgia, but has no physician managing.  No new neurological symptoms indicative of worsening MS including dysarthria, dysphagia, diplopia, focal weakness, focal sensory change, ataxia, vertigo, or bowel/bladder incontinence, among others.    Continues to use dimethyl fumarate once a day instead of twice a day because of diarrhea.  Even taking it once a day, she has soft bowel movement 3-4 days a week.    Past Medical History:   Diagnosis Date    Hypersomnia, unspecified 09/10/2019    Hypersomnia    Multiple sclerosis (Multi)     History of multiple sclerosis    Personal history of other endocrine, nutritional and metabolic disease     History of high cholesterol    Personal history of other specified conditions 01/04/2018    History of insomnia     No past surgical history on file.  Social History     Tobacco Use    Smoking status: Never    Smokeless tobacco: Never   Substance Use Topics    Alcohol use: Never     family history includes Alzheimer's disease in her mother; Malignant neoplasm in her father and mother.    Current Outpatient Medications:     aspirin 81 mg EC tablet, Take 1 tablet (81 mg) by mouth., Disp: , Rfl:      atorvastatin (Lipitor) 10 mg tablet, Take 1 tablet (10 mg) by mouth., Disp: , Rfl:     baclofen (Lioresal) 20 mg tablet, TAKE 1 TABLET BY MOUTH THREE TIMES DAILY, Disp: 90 tablet, Rfl: 11    bethanechol (Urecholine) 50 mg tablet, Take 1 tablet (50 mg) by mouth., Disp: , Rfl:     bimatoprost (Latisse) 0.03 % ophthalmic solution, Apply daily to areas of decreased hair on eyebrows, Disp: 5 mL, Rfl: 3    capsicum (Zostrix) 0.075 % topical cream, Apply 1 Application topically. PER DIRECTED, Disp: , Rfl:     chlorthalidone (Hygroton) 25 mg tablet, TAKE 1 TABLET BY MOUTH EVERY DAY, Disp: 90 tablet, Rfl: 3    ciclopirox (Penlac) 8 % solution, APPLY TO AFFECTED AREA DAILY AT BEDTIME. REMOVE FILM FROM MEDICATION ONCE WEEKLY WITH ALCOHOL SWAB, Disp: , Rfl:     clindamycin (Cleocin T) 1 % lotion, Apply 1 Application topically 2 times a day. apply 1 application twice a day topically to red bumps on skin, Disp: , Rfl:     cycloSPORINE (Restasis MultiDose) 0.05 % drops, Administer 1 drop into both eyes 2 times a day., Disp: , Rfl:     dexAMETHasone (Decadron) 4 mg tablet, Take 1 tablet (4 mg) by mouth 2 times a day. TAKE 2 TAB TWICE DAILY FOR 3 DAYS STARTING 1 DAY PRIOR TO TAXOTERE  PER DIRECTED, Disp: , Rfl:     diclofenac (Voltaren) 0.1 % ophthalmic solution, Administer into affected eye(s)., Disp: , Rfl:     dimethyl fumarate (Tecfidera) 240 mg capsule,delayed release(DR/EC) capsule, Take 1 capsule (240 mg) by mouth 2 times a day., Disp: 180 capsule, Rfl: 3    DULoxetine (Cymbalta) 60 mg DR capsule, Take 1 capsule (60 mg) by mouth., Disp: , Rfl:     ergocalciferol (Vitamin D-2) 1.25 MG (32087 UT) capsule, Take 1 capsule (1,250 mcg) by mouth 1 (one) time per week., Disp: , Rfl:     fluticasone (Flonase) 50 mcg/actuation nasal spray, Administer into affected nostril(s)., Disp: , Rfl:     gabapentin (Neurontin) 100 mg capsule, Take 1 capsule (100 mg) by mouth 3 times a day., Disp: , Rfl:     heparin flush (heparin  LockFlush,Porcine,,PF,) 10 unit/mL injection, Infuse 5 mL into a venous catheter once daily. Indications: prevent clot from blocking an intravenous catheter, Disp: , Rfl:     hydrocortisone 1 % ointment, Apply topically. DO PER DIRECTED, Disp: , Rfl:     hydrocortisone 2.5 % lotion, Apply topically., Disp: , Rfl:     hydrOXYzine HCL (Atarax) 25 mg tablet, Take by mouth., Disp: , Rfl:     ibandronate (Boniva) 150 mg tablet, Take 1 tablet (150 mg) by mouth every 30 (thirty) days. Take in morning with full glass of water on an empty stomach. No food, drink, meds, or lying down for 60 minutes after., Disp: , Rfl:     ibuprofen 600 mg tablet, Take by mouth., Disp: , Rfl:     ketoconazole (NIZOral) 2 % shampoo, Apply topically 1 (one) time per week. Massage into scalp and let sit 5-10 minutes before rinsing, Disp: 120 mL, Rfl: 11    lidocaine (Lidoderm) 5 % patch, APPLY 1 PATCH TO INTACT SKIN REMOVE AFTER 12 HOURS EXTERNALLY ONCE A DAY FOR 30 DAYS, Disp: , Rfl:     loperamide (Imodium) 2 mg capsule, Take by mouth. SEE DETAILS ATTACHED, Disp: , Rfl:     loratadine (Claritin) 10 mg tablet, Take 1 tablet (10 mg) by mouth once daily., Disp: , Rfl:     methylPREDNISolone sodium succinate, PF, (SOLU-Medrol) 1,000 mg injection, Infuse 1,000 mg into a venous catheter once daily. Infuse for 3 consecutive days. Dispense with 250ml NaCl for infusion and bacteriostatic water to reconstitute. Infuse per peripheral access. Dx: MS, Disp: 3000 mg, Rfl: 0    minoxidiL 5 % foam, Apply 1 Application topically once daily. (Patient not taking: Reported on 4/15/2024), Disp: 60 g, Rfl: 11    modafinil (Provigil) 100 mg tablet, Take 1 tablet (100 mg) by mouth once daily., Disp: , Rfl:     multivitamin tablet, Take 1 tablet by mouth once daily., Disp: , Rfl:     pantoprazole (ProtoNix) 40 mg EC tablet, Take 1 tablet (40 mg) by mouth., Disp: , Rfl:     potassium chloride CR (Klor-Con M20) 20 mEq ER tablet, Take 1 tablet (20 mEq) by mouth once  "daily., Disp: , Rfl:     propranolol XL (Innopran XL) 80 mg 24 hr capsule, Take 1 capsule (80 mg) by mouth once daily., Disp: , Rfl:     simethicone (Mylicon) 125 mg chewable tablet, Chew 1 tablet (125 mg) every 6 hours if needed., Disp: , Rfl:     sodium chloride 0.9% (sodium chloride) flush, Infuse 10 mL into a venous catheter once daily. Indications: flushing iv line using the sash method, Disp: , Rfl:     tiZANidine (Zanaflex) 4 mg tablet, Take 1 tablet (4 mg) by mouth 3 times a day. TAKE 1 TABLET BY MOUTH 3 TIMES DAILY AS NEEDED FOR BACK AND LEG CRAMPS, Disp: , Rfl:     traMADol (Ultram) 50 mg tablet, Take 1 tablet (50 mg) by mouth every 8 hours if needed., Disp: , Rfl:     traZODone (Desyrel) 50 mg tablet, Take 2 tablets (100 mg) by mouth once daily at bedtime., Disp: 60 tablet, Rfl: 5    tretinoin (Retin-A) 0.025 % cream, Apply topically once daily at bedtime. A pea-sized amount to the whole face, start every 2-3 nights and gradually increase to nightly (Patient not taking: Reported on 4/15/2024), Disp: 45 g, Rfl: 3    triamcinolone (Kenalog) 0.1 % cream, Apply 1 Application topically. PER DIRECTED, Disp: , Rfl:     valsartan (Diovan) 40 mg tablet, Take 1 tablet (40 mg) by mouth 2 times a day., Disp: , Rfl:   Allergies   Allergen Reactions    Adhesive Unknown    Diphenhydramine Unknown    Penicillins Unknown       Objective     /75   Pulse 66   Temp 35.9 °C (96.6 °F)   Ht 1.626 m (5' 4\")   Wt 72.6 kg (160 lb)   BMI 27.46 kg/m²     CONSTITUTIONAL:  No acute distress    EXTREMITIES:  Focal tenderness of the SI joints and hips bilaterally.    MENTAL STATUS:  Awake, alert, fully oriented to self, place, and time, with present short-term memory, good awareness of recent events, normal attention span, concentration, and fund of knowledge.    SPEECH AND LANGUAGE:  Can name and repeat, follows all commands, has no dysarthria    CRANIAL NERVES:  II-Vision present, visual fields full to confrontational " testing    III/IV/VI--EOMs are present in all directions.  Pupils are symmetrically reactive in dim light.  No ptosis.    V--Normal facial sensation.    VII--No facial asymmetry.    VIII--Hearing present to voice bilaterally.    IX/X--Symmetric soft palate rise.    XI--Normal trapezius power bilaterally.    XII--Tongue protrudes without deviation.    MOTOR:  Normal power, tone, and bulk in both arms and both legs.    SENSORY:  Reduced pin sensation in a stocking distribution from the feet to the mid-shins (stable).     COORDINATION:  Normal finger-to-nose and heel-to-shin testing in both arms and both legs.    REFLEXES are normal and symmetric at the biceps, triceps, brachioradialis, patella, and ankle.  The plantar responses are flexor.    GAIT is antalgic due to bilateral hip pain and low back pain.  She has no steppage, ataxia, shuffling, or spasticity.  Gait is more stable with a single-post cane.      Phani Youngblood Jr., M.D., FAAN

## 2024-04-15 NOTE — LETTER
April 15, 2024     Nahid Cardenas MD  [address] [facility], [facility] [address]    Patient: Genny Grant   YOB: 1965   Date of Visit: 4/15/2024       Dear Dr. Nahid Cardenas MD:    Thank you for allowing me to continue in the neurological care of your patient, Genny Grant. Below are my notes for this visit.  If you have questions, please do not hesitate to call me.       Sincerely,     Phani Youngblood Jr., M.D., FAAN       ______________________________________________________________________________________    NEUROLOGY OUTPATIENT FOLLOW-UP NOTE    Assessment/Plan  Diagnoses and all orders for this visit:  Multiple sclerosis (Multi)  -     Cane      IMPRESSION:  Stable multiple sclerosis.  Non-neurological hip and SI joint pain likely referable to arthritis.    PLAN:  Continue dimethyl fumarate daily.  Advised to cut tizanidine in half to avoid drowsiness, and will continue baclofen.  Advised to follow up with her PCP regarding the joint pain.  I will see her again in six months or prn.      Phani Youngblood Jr., M.D., FAAN   ----------    Subjective    Genny Grant is a 59 y.o. year old female here for follow-up.    Cramps in both legs.  Takes tizanidine, which helps, but causes drowsiness.  She reports hip and back pain that limit her walking.  Reports symptoms of fibromyalgia, but has no physician managing.  No new neurological symptoms indicative of worsening MS including dysarthria, dysphagia, diplopia, focal weakness, focal sensory change, ataxia, vertigo, or bowel/bladder incontinence, among others.    Continues to use dimethyl fumarate once a day instead of twice a day because of diarrhea.  Even taking it once a day, she has soft bowel movement 3-4 days a week.    Past Medical History:   Diagnosis Date   • Hypersomnia, unspecified 09/10/2019    Hypersomnia   • Multiple sclerosis (Multi)     History of multiple sclerosis   • Personal history of  other endocrine, nutritional and metabolic disease     History of high cholesterol   • Personal history of other specified conditions 01/04/2018    History of insomnia     No past surgical history on file.  Social History     Tobacco Use   • Smoking status: Never   • Smokeless tobacco: Never   Substance Use Topics   • Alcohol use: Never     family history includes Alzheimer's disease in her mother; Malignant neoplasm in her father and mother.    Current Outpatient Medications:   •  aspirin 81 mg EC tablet, Take 1 tablet (81 mg) by mouth., Disp: , Rfl:   •  atorvastatin (Lipitor) 10 mg tablet, Take 1 tablet (10 mg) by mouth., Disp: , Rfl:   •  baclofen (Lioresal) 20 mg tablet, TAKE 1 TABLET BY MOUTH THREE TIMES DAILY, Disp: 90 tablet, Rfl: 11  •  bethanechol (Urecholine) 50 mg tablet, Take 1 tablet (50 mg) by mouth., Disp: , Rfl:   •  bimatoprost (Latisse) 0.03 % ophthalmic solution, Apply daily to areas of decreased hair on eyebrows, Disp: 5 mL, Rfl: 3  •  capsicum (Zostrix) 0.075 % topical cream, Apply 1 Application topically. PER DIRECTED, Disp: , Rfl:   •  chlorthalidone (Hygroton) 25 mg tablet, TAKE 1 TABLET BY MOUTH EVERY DAY, Disp: 90 tablet, Rfl: 3  •  ciclopirox (Penlac) 8 % solution, APPLY TO AFFECTED AREA DAILY AT BEDTIME. REMOVE FILM FROM MEDICATION ONCE WEEKLY WITH ALCOHOL SWAB, Disp: , Rfl:   •  clindamycin (Cleocin T) 1 % lotion, Apply 1 Application topically 2 times a day. apply 1 application twice a day topically to red bumps on skin, Disp: , Rfl:   •  cycloSPORINE (Restasis MultiDose) 0.05 % drops, Administer 1 drop into both eyes 2 times a day., Disp: , Rfl:   •  dexAMETHasone (Decadron) 4 mg tablet, Take 1 tablet (4 mg) by mouth 2 times a day. TAKE 2 TAB TWICE DAILY FOR 3 DAYS STARTING 1 DAY PRIOR TO TAXOTERE  PER DIRECTED, Disp: , Rfl:   •  diclofenac (Voltaren) 0.1 % ophthalmic solution, Administer into affected eye(s)., Disp: , Rfl:   •  dimethyl fumarate (Tecfidera) 240 mg capsule,delayed  release(DR/EC) capsule, Take 1 capsule (240 mg) by mouth 2 times a day., Disp: 180 capsule, Rfl: 3  •  DULoxetine (Cymbalta) 60 mg DR capsule, Take 1 capsule (60 mg) by mouth., Disp: , Rfl:   •  ergocalciferol (Vitamin D-2) 1.25 MG (42255 UT) capsule, Take 1 capsule (1,250 mcg) by mouth 1 (one) time per week., Disp: , Rfl:   •  fluticasone (Flonase) 50 mcg/actuation nasal spray, Administer into affected nostril(s)., Disp: , Rfl:   •  gabapentin (Neurontin) 100 mg capsule, Take 1 capsule (100 mg) by mouth 3 times a day., Disp: , Rfl:   •  heparin flush (heparin LockFlush,Porcine,,PF,) 10 unit/mL injection, Infuse 5 mL into a venous catheter once daily. Indications: prevent clot from blocking an intravenous catheter, Disp: , Rfl:   •  hydrocortisone 1 % ointment, Apply topically. DO PER DIRECTED, Disp: , Rfl:   •  hydrocortisone 2.5 % lotion, Apply topically., Disp: , Rfl:   •  hydrOXYzine HCL (Atarax) 25 mg tablet, Take by mouth., Disp: , Rfl:   •  ibandronate (Boniva) 150 mg tablet, Take 1 tablet (150 mg) by mouth every 30 (thirty) days. Take in morning with full glass of water on an empty stomach. No food, drink, meds, or lying down for 60 minutes after., Disp: , Rfl:   •  ibuprofen 600 mg tablet, Take by mouth., Disp: , Rfl:   •  ketoconazole (NIZOral) 2 % shampoo, Apply topically 1 (one) time per week. Massage into scalp and let sit 5-10 minutes before rinsing, Disp: 120 mL, Rfl: 11  •  lidocaine (Lidoderm) 5 % patch, APPLY 1 PATCH TO INTACT SKIN REMOVE AFTER 12 HOURS EXTERNALLY ONCE A DAY FOR 30 DAYS, Disp: , Rfl:   •  loperamide (Imodium) 2 mg capsule, Take by mouth. SEE DETAILS ATTACHED, Disp: , Rfl:   •  loratadine (Claritin) 10 mg tablet, Take 1 tablet (10 mg) by mouth once daily., Disp: , Rfl:   •  methylPREDNISolone sodium succinate, PF, (SOLU-Medrol) 1,000 mg injection, Infuse 1,000 mg into a venous catheter once daily. Infuse for 3 consecutive days. Dispense with 250ml NaCl for infusion and  bacteriostatic water to reconstitute. Infuse per peripheral access. Dx: MS, Disp: 3000 mg, Rfl: 0  •  minoxidiL 5 % foam, Apply 1 Application topically once daily. (Patient not taking: Reported on 4/15/2024), Disp: 60 g, Rfl: 11  •  modafinil (Provigil) 100 mg tablet, Take 1 tablet (100 mg) by mouth once daily., Disp: , Rfl:   •  multivitamin tablet, Take 1 tablet by mouth once daily., Disp: , Rfl:   •  pantoprazole (ProtoNix) 40 mg EC tablet, Take 1 tablet (40 mg) by mouth., Disp: , Rfl:   •  potassium chloride CR (Klor-Con M20) 20 mEq ER tablet, Take 1 tablet (20 mEq) by mouth once daily., Disp: , Rfl:   •  propranolol XL (Innopran XL) 80 mg 24 hr capsule, Take 1 capsule (80 mg) by mouth once daily., Disp: , Rfl:   •  simethicone (Mylicon) 125 mg chewable tablet, Chew 1 tablet (125 mg) every 6 hours if needed., Disp: , Rfl:   •  sodium chloride 0.9% (sodium chloride) flush, Infuse 10 mL into a venous catheter once daily. Indications: flushing iv line using the sash method, Disp: , Rfl:   •  tiZANidine (Zanaflex) 4 mg tablet, Take 1 tablet (4 mg) by mouth 3 times a day. TAKE 1 TABLET BY MOUTH 3 TIMES DAILY AS NEEDED FOR BACK AND LEG CRAMPS, Disp: , Rfl:   •  traMADol (Ultram) 50 mg tablet, Take 1 tablet (50 mg) by mouth every 8 hours if needed., Disp: , Rfl:   •  traZODone (Desyrel) 50 mg tablet, Take 2 tablets (100 mg) by mouth once daily at bedtime., Disp: 60 tablet, Rfl: 5  •  tretinoin (Retin-A) 0.025 % cream, Apply topically once daily at bedtime. A pea-sized amount to the whole face, start every 2-3 nights and gradually increase to nightly (Patient not taking: Reported on 4/15/2024), Disp: 45 g, Rfl: 3  •  triamcinolone (Kenalog) 0.1 % cream, Apply 1 Application topically. PER DIRECTED, Disp: , Rfl:   •  valsartan (Diovan) 40 mg tablet, Take 1 tablet (40 mg) by mouth 2 times a day., Disp: , Rfl:   Allergies   Allergen Reactions   • Adhesive Unknown   • Diphenhydramine Unknown   • Penicillins Unknown  "      Objective    /75   Pulse 66   Temp 35.9 °C (96.6 °F)   Ht 1.626 m (5' 4\")   Wt 72.6 kg (160 lb)   BMI 27.46 kg/m²     CONSTITUTIONAL:  No acute distress    EXTREMITIES:  Focal tenderness of the SI joints and hips bilaterally.    MENTAL STATUS:  Awake, alert, fully oriented to self, place, and time, with present short-term memory, good awareness of recent events, normal attention span, concentration, and fund of knowledge.    SPEECH AND LANGUAGE:  Can name and repeat, follows all commands, has no dysarthria    CRANIAL NERVES:  II-Vision present, visual fields full to confrontational testing    III/IV/VI--EOMs are present in all directions.  Pupils are symmetrically reactive in dim light.  No ptosis.    V--Normal facial sensation.    VII--No facial asymmetry.    VIII--Hearing present to voice bilaterally.    IX/X--Symmetric soft palate rise.    XI--Normal trapezius power bilaterally.    XII--Tongue protrudes without deviation.    MOTOR:  Normal power, tone, and bulk in both arms and both legs.    SENSORY:  Reduced pin sensation in a stocking distribution from the feet to the mid-shins (stable).     COORDINATION:  Normal finger-to-nose and heel-to-shin testing in both arms and both legs.    REFLEXES are normal and symmetric at the biceps, triceps, brachioradialis, patella, and ankle.  The plantar responses are flexor.    GAIT is antalgic due to bilateral hip pain and low back pain.  She has no steppage, ataxia, shuffling, or spasticity.  Gait is more stable with a single-post cane.      Phani Youngblood Jr., M.D., FAAN      "

## 2024-05-29 ENCOUNTER — OFFICE VISIT (OUTPATIENT)
Dept: DERMATOLOGY | Facility: CLINIC | Age: 59
End: 2024-05-29
Payer: MEDICARE

## 2024-05-29 DIAGNOSIS — L82.1 DERMATOSIS PAPULOSA NIGRA: ICD-10-CM

## 2024-05-29 DIAGNOSIS — R20.9 DISTURBANCE OF SKIN SENSATION: ICD-10-CM

## 2024-05-29 PROCEDURE — 17110 DESTRUCTION B9 LES UP TO 14: CPT | Performed by: DERMATOLOGY

## 2024-05-29 PROCEDURE — 1036F TOBACCO NON-USER: CPT | Performed by: DERMATOLOGY

## 2024-05-29 ASSESSMENT — ITCH NUMERIC RATING SCALE: HOW SEVERE IS YOUR ITCHING?: 0

## 2024-05-29 NOTE — PROGRESS NOTES
Subjective     Genny Grant is a 59 y.o. female who presents for the following: Skin Tags (Face and Neck).     Prior auth sent, no approval necessary, patient understands and has elected for insurance visit. Offered cosmetic billing and visit and she declined    Review of Systems:  No other skin or systemic complaints other than what is documented elsewhere in the note.    The following portions of the chart were reviewed this encounter and updated as appropriate:   Tobacco  Allergies  Meds  Problems  Med Hx  Surg Hx         Skin Cancer History  No skin cancer on file.      Specialty Problems          Dermatology Problems    Other seborrheic dermatitis    Telogen effluvium    Contusion of multiple sites        Objective   Well appearing patient in no apparent distress; mood and affect are within normal limits.    A focused skin examination was performed. All findings within normal limits unless otherwise noted below.    Assessment/Plan   1. Dermatosis papulosa nigra (2)  Head - Anterior (Face), Neck - Anterior  Erythematous, inflamed pedunculated papule(s) with clinically evident irritation/inflammation     After clinical evaluation including history and examination today, a decision was made to perform a procedure for removal of inflamed skin growths      Staff Communication: Dermatology Local Anesthesia: 1 % Lidocaine / Epinephrine - Amount: 0.5cc - Head - Anterior (Face), Neck - Anterior    Destr of lesion - Head - Anterior (Face), Neck - Anterior  Complexity: simple    Destruction method comment:  Hyfrecator and scissor  Anesthesia: the lesion was anesthetized in a standard fashion    Anesthetic:  1% lidocaine w/ epinephrine 1-100,000 local infiltration  Hemostasis achieved with:  electrodesiccation  Outcome: patient tolerated procedure well with no complications      Related Procedures  Follow Up In Dermatology - Established Patient    2. Disturbance of skin sensation (2)  Head - Anterior  (Face); Neck - Anterior    Associated with DPNs

## 2024-06-03 PROBLEM — G47.9 SLEEP DISORDER: Status: RESOLVED | Noted: 2023-08-21 | Resolved: 2024-06-03

## 2024-06-03 NOTE — PROGRESS NOTES
[facility]  [facility]  [address]  [facility] [address]  [phone]     [facility]  New Visit Note      Subjective   Patient ID: Genny Grant is a 59 y.o. female with past medical history significant for Overweight, Hypertension, Palpitation, SVT, Insomnia, and Obstructive sleep apnea      6/10/2024: The patient is here alone today for comprehensive sleep medicine evaluation due to suspected sleep apnea, POSITIVE for snoring, waking up gasping and choking for air, nasal congestion, night sweats during sleep, waking up with racing heart, heartburn or sour taste in mouth at night, nocturia excessive daytime sleepiness, waking up at night, and insomnia. She reported that she had a CPAP before but did not tolerate it, and the insurance company took it out. Her ESS is 11, and her IBIS is 28 today.      HPI  Patient had been having these symptoms for the past 15 years.  Patient had a sleep study, and revealed normal. However, she is symptomatic      SLEEP STUDY HISTORY: (personally reviewed raw data such as interpretation report, data sheet, hypnogram, and titration table if available and applicable)  4/27/2019: PSG: AHI : 1.3/hr, [place]: 89%    SLEEP-WAKE SCHEDULE  Bedtime: 10-11 PM on weekdays, same on weekends  Subjective sleep latency: 2-3 hours  Problems falling asleep: Yes  Number of awakenings: 4 times per night spontaneously for urination  Falls back asleep in 1 hour  Problems staying asleep: Yes  Final wake time: 5 AM on weekdays, 6 AM on weekends  Out of bed time: 6:30 AM on weekdays, 7:30 AM on weekends  Shift work: No  Naps: No  Average sleep duration (excluding naps): 4-5 hours    SLEEP ENVIRONMENT  Sleep location: bed  Sleep status: sleeps alone  Room is dark:  Yes  Room is quiet: Yes  Room is cool: Yes  Bed comfort: good    SLEEP HABITS:   Activities before bedtime: play cell  phone, watch TV, read Bible  Activities in bed: same  Preferred sleep position: side    SLEEP ROS:  Night symptoms: POSITIVE for snoring, wake up gasping and/or choking for air, nasal congestion , night sweats during sleep, waking up with racing heart, heartburn or sour taste in mouth at night, and nocturia  Morning symptoms: POSITIVE for unrefreshing sleep, morning headache, and morning dry mouth  Daytime symptoms POSITIVE for excessive daytime sleepiness, fatigue, trouble remembering things in daytime, trouble staying focused in daytime, and irritability in daytime  Hypersomnia / narcolepsy symptoms: Patient denies symptoms of a hypersomnolence disorder such as sleep paralysis, sleep-related hallucinations, recurrent sleep attacks, automatic behaviors, and cataplexy.   RLS symptoms: Patient admits having urge to move legs that occurs at rest (sitting, getting into bed, or lying n bed), worse in the evening, and relieved temporarily with movement.   Frequency of RLS symptoms: every night  RLS symptoms progressing to arms: Yes   RLS symptoms affect sleep onset: Yes   RLS symptoms wakes patient up from sleep: Yes   Movements in sleep: POSITIVE for frequent leg kicks / jerks at night while asleep  Parasomnia symptoms: Patient denies symptoms of parasomnia such as sleepwalking, sleeptalking, sleep-eating, acting out dreams, and nightmares.     WEIGHT: fluctuating    REVIEW OF SYSTEMS: All other systems have been reviewed and are negative.    PERTINENT SOCIAL HISTORY:  Occupation: no  Smoking: No   ETOH: No   Marijuana: No   Caffeine: Yes, pepsi: every blue moon  Sleep aids: Yes \: Trazodone 50 mg  Claustrophobia: Yes     PERTINENT PAST SURGICAL HISTORY:  non-contributory    PERTINENT FAMILY HISTORY:  sleep apnea- mother    Active Problems, Allergy List, Medication List, and PMH/PSH/FH/Social Hx have been reviewed and reconciled in chart. No significant changes unless documented in the pertinent chart section. Updates  made when necessary.     Objective     Vitals:    06/10/24 1050   BP: 106/73   Pulse: 70   Temp: 36.3 °C (97.3 °F)   SpO2: 93%     Physical Exam  Constitutional:alert and oriented to time, place, and person  Sinus: - tenderness to palpation  Palate:  Narrow, + torus palatini Mallampati 3, + Tongue scalloping, + macroglossia  Lungs: Clear to auscultation bilateral, no rales  Heart: Regular rate and rhythm, no murmurs    Assessment/Plan   Genny Grant is a 59 y.o. female who is seen to evaluate for obstructive sleep apnea. The pathophysiology of sleep apnea, diagnostic testing (HST vs PSG), treatment options (PAP, oral appliance, surgery, hypoglossal nerve stimulator called Inspire), and supportive management (weight loss, positional therapy, smoking cessation, avoidance of alcohol and sedatives) were discussed with the patient in detail. Risk factors of sleep apnea as well as cardiometabolic and neurocognitive sequelae associated with untreated sleep apnea were also discussed. Lastly, patient was advised to avoid driving vehicle or operating heavy machinery when sleepy.     Genny ANDREW Grant with the following problems:    # SLEEP DISORDERED BREATHING:  -This is no sleep disorder breathing based on the the previous Home Sleep Study.  -Instruct patient to complete an in lab sleep study.  -Emphasized diet, exercise, and weight loss in the clinic, as were non-supine sleep, avoiding alcohol in the late evening, and driving or operating heavy machinery when sleepy.  -We consider treatment as indicated when testing is complete.  -Genny Isabel Rudyverbalizes understanding of the above instructions and risks.    # CHRONIC SLEEP ONSET/ SLEEP MAINTENANCE INSOMNIA:  -likely due to poor sleep hygiene, irregular sleep schedule, depression, anxiety, untreated sleep apnea, and Restless Legs Syndrome.  -IBIS:19  -Sleep hygiene discuss in the clinic.    # DEPRESSION and ANXIETY:   -Genny Grantis taking  "Cymbalta and participating in psychotherapy.  -Denies HI/SI     # HYPERTENSION:  -Blood pressure was controlled today. To control her blood pressure better, instruct the patient to take anti-hypertension medication at bedtime and a water pill in the waking time.  -Denies headache, palpitation, and syncope in the clinic.  -Follows with PCP/ Cardiology     # OVERWEIGHT:  -with a BMI of 27.46. Genny Grant most recent Bicarbonate was 32 in 2022  Bicarbonate   Date Value Ref Range Status   06/22/2022 32 21 - 32 mmol/L Final   -Encourage to have regular exercise to manage weight well.    # NASAL CONGESTION:  -Instruct Genny Langston use appropriate Flonase spray to ease congestion.  -Refill Flonase spray.    # XEROSTOMIA:  -Instruct Genny Langston purchase the Biotene gel to ease the dry mouth symptom,     # RESTLESS LEG SYNDROME: This occurs every night  -Will revaluate it after sleep study/treatment.  No results found for: \"IRON\", \"TRANSFERRIN\", \"IRONSAT\", \"TIBC\", \"FERRITIN\"  - Take Gabapentin 100 mg/BID     RTC 2-3 weeks after sleep study       All of patient's questions were answered. She verbalizes understanding and agreement with my assessment and plan.  "

## 2024-06-10 ENCOUNTER — OFFICE VISIT (OUTPATIENT)
Dept: SLEEP MEDICINE | Facility: HOSPITAL | Age: 59
End: 2024-06-10
Payer: MEDICARE

## 2024-06-10 VITALS
BODY MASS INDEX: 27.81 KG/M2 | OXYGEN SATURATION: 93 % | SYSTOLIC BLOOD PRESSURE: 106 MMHG | HEART RATE: 70 BPM | DIASTOLIC BLOOD PRESSURE: 73 MMHG | TEMPERATURE: 97.3 F | WEIGHT: 162 LBS

## 2024-06-10 DIAGNOSIS — I47.10 PAROXYSMAL SVT (SUPRAVENTRICULAR TACHYCARDIA) (CMS-HCC): ICD-10-CM

## 2024-06-10 DIAGNOSIS — G47.33 OSA ON CPAP: ICD-10-CM

## 2024-06-10 DIAGNOSIS — G47.00 INSOMNIA, UNSPECIFIED TYPE: ICD-10-CM

## 2024-06-10 DIAGNOSIS — E66.3 OVERWEIGHT (BMI 25.0-29.9): ICD-10-CM

## 2024-06-10 DIAGNOSIS — F41.9 ANXIETY DISORDER, UNSPECIFIED TYPE: ICD-10-CM

## 2024-06-10 DIAGNOSIS — I10 BENIGN ESSENTIAL HTN: ICD-10-CM

## 2024-06-10 DIAGNOSIS — G47.30 SLEEP DISORDER BREATHING: Primary | ICD-10-CM

## 2024-06-10 DIAGNOSIS — R09.81 NASAL CONGESTION: ICD-10-CM

## 2024-06-10 PROBLEM — R53.82 CHRONIC FATIGUE: Status: RESOLVED | Noted: 2023-08-21 | Resolved: 2024-06-10

## 2024-06-10 PROBLEM — R07.9 CHEST PAIN: Status: RESOLVED | Noted: 2017-08-15 | Resolved: 2024-06-10

## 2024-06-10 PROCEDURE — 99214 OFFICE O/P EST MOD 30 MIN: CPT

## 2024-06-10 PROCEDURE — 3078F DIAST BP <80 MM HG: CPT

## 2024-06-10 PROCEDURE — 3074F SYST BP LT 130 MM HG: CPT

## 2024-06-10 PROCEDURE — 99204 OFFICE O/P NEW MOD 45 MIN: CPT

## 2024-06-10 PROCEDURE — G2211 COMPLEX E/M VISIT ADD ON: HCPCS

## 2024-06-10 PROCEDURE — 1036F TOBACCO NON-USER: CPT

## 2024-06-10 RX ORDER — FLUTICASONE PROPIONATE 50 MCG
2 SPRAY, SUSPENSION (ML) NASAL DAILY
Qty: 16 G | Refills: 1 | Status: SHIPPED | OUTPATIENT
Start: 2024-06-10 | End: 2024-09-08

## 2024-06-10 RX ORDER — VITAMIN A 3000 MCG
CAPSULE ORAL
COMMUNITY
Start: 2024-04-22

## 2024-06-10 RX ORDER — SUCRALFATE 1 G/1
TABLET ORAL
COMMUNITY
Start: 2024-05-22

## 2024-06-10 RX ORDER — FUROSEMIDE 40 MG/1
1 TABLET ORAL
COMMUNITY
Start: 2023-12-18

## 2024-06-10 SDOH — ECONOMIC STABILITY: FOOD INSECURITY: WITHIN THE PAST 12 MONTHS, YOU WORRIED THAT YOUR FOOD WOULD RUN OUT BEFORE YOU GOT MONEY TO BUY MORE.: NEVER TRUE

## 2024-06-10 SDOH — ECONOMIC STABILITY: FOOD INSECURITY: WITHIN THE PAST 12 MONTHS, THE FOOD YOU BOUGHT JUST DIDN'T LAST AND YOU DIDN'T HAVE MONEY TO GET MORE.: NEVER TRUE

## 2024-06-10 ASSESSMENT — PATIENT HEALTH QUESTIONNAIRE - PHQ9
10. IF YOU CHECKED OFF ANY PROBLEMS, HOW DIFFICULT HAVE THESE PROBLEMS MADE IT FOR YOU TO DO YOUR WORK, TAKE CARE OF THINGS AT HOME, OR GET ALONG WITH OTHER PEOPLE: NOT DIFFICULT AT ALL
SUM OF ALL RESPONSES TO PHQ9 QUESTIONS 1 & 2: 1
1. LITTLE INTEREST OR PLEASURE IN DOING THINGS: NOT AT ALL
2. FEELING DOWN, DEPRESSED OR HOPELESS: SEVERAL DAYS

## 2024-06-10 ASSESSMENT — SLEEP AND FATIGUE QUESTIONNAIRES
SLEEP_PROBLEM_INTERFERES_DAILY_ACTIVITIES: VERY MUCH NOTICEABLE
DIFFICULTY_FALLING_ASLEEP: VERY SEVERE
SLEEP_PROBLEM_NOTICEABLE_TO_OTHERS: VERY MUCH NOTICEABLE
WORRIED_DISTRESSED_DUE_TO_SLEEP: VERY MUCH NOTICEABLE
HOW LIKELY ARE YOU TO NOD OFF OR FALL ASLEEP IN A CAR, WHILE STOPPED FOR A FEW MINUTES IN TRAFFIC: WOULD NEVER DOZE
SATISFACTION_WITH_CURRENT_SLEEP_PATTERN: VERY DISSATISFIED
HOW LIKELY ARE YOU TO NOD OFF OR FALL ASLEEP WHILE SITTING QUIETLY AFTER LUNCH WITHOUT ALCOHOL: MODERATE CHANCE OF DOZING
ESS-CHAD TOTAL SCORE: 11
WAKING_TOO_EARLY: VERY SEVERE
HOW LIKELY ARE YOU TO NOD OFF OR FALL ASLEEP WHILE WATCHING TV: MODERATE CHANCE OF DOZING
DIFFICULTY_STAYING_ASLEEP: VERY SEVERE
HOW LIKELY ARE YOU TO NOD OFF OR FALL ASLEEP WHEN YOU ARE A PASSENGER IN A CAR FOR AN HOUR WITHOUT A BREAK: MODERATE CHANCE OF DOZING
HOW LIKELY ARE YOU TO NOD OFF OR FALL ASLEEP WHILE LYING DOWN TO REST IN THE AFTERNOON WHEN CIRCUMSTANCES PERMIT: MODERATE CHANCE OF DOZING
HOW LIKELY ARE YOU TO NOD OFF OR FALL ASLEEP WHILE SITTING AND READING: SLIGHT CHANCE OF DOZING
HOW LIKELY ARE YOU TO NOD OFF OR FALL ASLEEP WHILE SITTING AND TALKING TO SOMEONE: WOULD NEVER DOZE
SITING INACTIVE IN A PUBLIC PLACE LIKE A CLASS ROOM OR A MOVIE THEATER: MODERATE CHANCE OF DOZING

## 2024-06-10 ASSESSMENT — PAIN SCALES - GENERAL: PAINLEVEL: 0-NO PAIN

## 2024-06-10 NOTE — PATIENT INSTRUCTIONS
[facility] Sleep Medicine  [facility] [place]  [address]  [facility] [address] [phone]       Thank you for coming to the Sleep Medicine Clinic today! Your sleep medicine provider today was: CARMINA Trevizo Below is a summary of your treatment plan, patient education, other important information, and our contact numbers.    Dear Genny Grant,    Thank you for visiting  Sleep Medicine Clinic !     1. We will proceed with a SPLIT NIGHT/ IN-CENTER/ HOME/ PAP TITRATION sleep study to check your risk of sleep apnea. The lab will call you and schedule it for you.     1. We will proceed with an in-lab sleep apnea test, followed by a MSLT. The lab will call you and schedule it for you.   - We will check your TSH/ HLA/CBC/CMP/iron profile to rule out underlying metabolic etiologies.  - We will collect urine drug text on the same day of MSLT.  - Please fill 2 weeks sleep diary to be reviewed.  - Scheduled naps  - Consolidate nighttime sleep.  - will reevaluate it and sign an agreement if needed next visit    1. According to your symptom and sleep study report. I will order the new PAP device for you to control your sleep apnea, feel free to contact your DME.   If [employer] is your DME, you can reach them at [phone].   If [employer] is your DME, you can reach them at [phone], Horacio : [phone].  If [employer]  is your DME, you can reach them at [phone].    1.You are doing great, we ordered the annual supplies for you. Feel free to contact your DME company to get new supplies.    1. BARIATRIC SURGERY INSTRUCTION  -Please continuous using your PAP to treat your sleep apnea.  -Bring your PAP and all equipment with you to surgery.  -Use your PAP with surgery and during recovery.  -Keep your head of the bed at 30* or higher.    2. Please do not drive when you are sleepy and  start practicing the sleep  hygiene  as discussed in clinic today.     3. Please start/continue practicing the appropriate nasal spray at night to ease your nasal congestion as discussed in clinic today, and using Biotene to ease your dry mouth if needed.    4.Your blood pressure was elevated in the office today. Please obtain a home blood pressure monitoring kit, log your blood pressure at home, and follow up with your primary care physician. To control your blood pressure better, please take anti-hypertension medication at bedtime and a water pill at waking time.      5. We are going to do blood work to investigate your restless legs syndrome. Please fast 8 hours prior blood draw.    6. For medication refill, please call Tierra Velázquez : [phone], option 2  Or Fax: [phone].     7. FOR QUESTIONS AND CONCERNS:   a) : In case of problems with machine or mask interface, please contact your DME company first. [employer] is the company who provides you the machine and/or PAP supplies / accessories. If [employer] is your DME, you can reach them at [phone].   b):  To schedule, cancel, or reschedule SLEEP STUDY appointments, please call [phone]  c): Please call my office with issues or questions: [phone] (Justyn); [phone] (Malinda); [phone] ([facility])    If you have a CPAP or BiPAP machine at home, please bring the unit and all accessories including the power cord to your appointments unless I tell you otherwise. Please have knowledge of the DME company you worked with to receive your PAP device. If you have copies of any previous sleep testing completed outside of , please bring with you to clinic as well. This information will make our visits more productive.     If you are new to CPAP or BiPAP, please note the minimum usage insurance requires to continuing coverage for the equipment as noted by your DME company. Please discuss equipment issues (PAP unit, mask fit, humidification, etc.) with your DME  company first.       In the event that you are running more than 10 minutes late to your appointment, I will kindly ask you to reschedule. Thanks.      TREATMENT PLAN     Follow-up Appointment:   Follow-up in 2-3 weeks after sleep study.    PATIENT EDUCATION     OBSTRUCTIVE SLEEP APNEA (DWIGHT) is a sleep disorder where your upper airway muscles relax during sleep and the airway intermittently and repetitively narrows and collapses leading to partially blocked airway (hypopnea) or completely blocked airway (apnea) which, in turn, can disrupt breathing in sleep, lower oxygen levels while you sleep and cause night time wakings. Because both apnea and hypopnea may cause higher carbon dioxide or low oxygen levels, untreated DWIGHT can lead to heart arrhythmia, elevation of blood pressure, and make it harder for the body to consolidate memory and facilitate metabolism (leading to higher blood sugars at night). Frequent partial arousals occur during sleep resulting in sleep deprivation and daytime sleepiness. DWIGHT is associated with an increased risk of cardiovascular disease, stroke, hypertension, and insulin resistance. Moreover, untreated DWIGHT with excessive daytime sleepiness can increase the risk of motor vehicular accidents.    Below are conservative strategies for DWIGHT regardless of DWIGHT severity are:   Positional therapy - Avoid sleeping on your back.   Healthy diet and regular exercise to optimize weight is highly encouraged.   Avoid alcohol late in the evening and sedative-hypnotics as these substances can make sleep apnea worse.   Improve breathing through the nose with intranasal steroid spray, saline rinse, or antihistamines    Safety: Avoid driving vehicle and operating heavy equipment while sleepy. Drowsy driving may lead to life-threatening motor vehicle accidents. A person driving while sleepy is 5 times more likely to have an accident. If you feel sleepy, pull over and take a short power nap (sleep for less than  30 minutes). Otherwise, ask somebody to drive you.    Treatment options for sleep apnea include weight management, positional therapy, Positive Airway Therapy (PAP) therapy, oral appliance therapy, hypoglossal nerve stimulator (Inspire) and select airway surgeries.    Sleep Testing for sleep apnea: The best and ideal way to check out if you have sleep apnea is to do an overnight sleep study in the sleep laboratory. Alternatively, a home sleep apnea test can also be done depending on your insurance and risk factors.     If you are having a home sleep apnea test, kindly allot 1 hour during pickup of the testing kit as you will have to complete paperwork and listen to the sleep technician for in-person on-the-spot demonstration and instructions on how to hook up the testing kit at home. Do the test for 1 day and start off with sleeping on your back. If you sleep on your side in the middle of night or you have always been a side or stomach-sleeper, it is ok as long as you have some time on your back and off-back.     If you are having an overnight in-lab sleep study, please make sure to bring toiletries, a comfy pillow, additional warm blankets, and any nighttime medications (include as-needed inhaler, pain pill, etc) that you may regularly take. Also, be sure to eat dinner before you arrive as we generally do not provide meals inside the sleep testing center. Lastly, in order to fall asleep faster in the sleep testing center, we advise patients to wake up 2 hours earlier on the morning of scheduled testing and avoid napping 2 days prior testing. Sometimes, your sleep provider may prescribe a sleep aid to be taken at lights out in the sleep testing center. If you are taking a sleep aid, consider having somebody pick you up after the sleep testing.    Overnight sleep studies may be scheduled on a weekday or weekend. We also perform daytime testing for shift workers on a case-by-case basis.    Once you have booked an  appointment to do the sleep study, please contact my office for follow-up visit to discuss results.    On the other hand, if you have any of the following, please consider calling the sleep testing center to RESCHEDULE your sleep study appointment:  If you tested positive for COVID within 10 days of your sleep study appointment.  If you were exposed to somebody who was confirmed for COVID within 10 days of your sleep study appointment and now you are having symptoms of possible COVID  If you have fever>100F or any acute symptoms that you think will lead to poor sleep during testing (e.g. new or worsening stuffy nose not relieved by steroid nasal spray)  If you have traveled domestically or internationally in the last month and now you are having symptoms of possible COVID    How to use nasal sprays properly: If you have nasal congestion or allergies, improving your breathing through the nose is critical for treating sleep apnea and improving your sleep. Hence, intranasal steroid spray like Flonase or Nasocort (generic name is Fluticasone) might help. In some patients with sleep apnea, using intranasal steroid spray allowed them to tolerate CPAP mask better.     Intranasal steroids are usually prescribed as 2 sprays per nostril 1 hour before bedtime. If you administer intranasal steroids too close to bedtime, it might not work as well.  If you have nasal congestion or allergies during day despite using Flonase at night, try adding Azelastine nasal spray 2 sprays per nostril in the morning. Alternatively, can consider adding over-the-counter non-sedating antihistamine medications.     However, if you have severe nasal congestion or allergies despite using both nasal sprays, consider seeing an allergy specialist to confirm which substance you are sensitive to and get definitive treatment which may be in the form of injections, oral pills, different kind of nose sprays, and/or a combination of everything said.     Below  are instructions on how to use intranasal steroid spray properly:  Sit up or stand up with your face down.  Shake the spray bottle and insert its tip in one nostril.  Point the spray tip towards your ear, and not towards the middle of your nose. Pointing the tip upward and in the middle of the nose can lead to discomfort and irritation of nasal mucosa which can lead to nose bleeding or coughing up tinges of blood.  Squeeze the spray bottle and administer the recommended amount of spray.   Don't sniff when you spray. Instead, remove the spray bottle and pinch your nose for 10 seconds.   Perform steps 1-6 on the other nostril.      Trouble falling asleep or trouble staying asleep is called INSOMNIA and it can be caused by many different things such as untreated sleep apnea, anxiety, depression, stress, poor sleep habits, and other medical conditions or medications. The best way to treat insomnia is to treat the cause. In general, we can all benefit from better sleep hygiene (also known as good sleep habits). Below are recommendations to help you improve your sleep so you can fall asleep, stay asleep, and wake up feeling refreshed.    Keep a routine bedtime and rise time even on weekends and non-work days. This helps your biological clock stay in sync with your sleep needs. If you currently have variable sleep-wake schedule, start off by waking up and getting out of bed the same time every day, even on weekends or non-work days. Whether you have a good or poor sleep the night before, waking up at the same time every day can help re-train and reset your body clock.  Go to bed when you are sleepy and not when tired nor before your goal bedtime. Long periods of time in bed will lead to fragmented, shallow, broken sleep.   Use the bed for sleep and intimacy only. Do not watch TV, eat, read or use phone/laptop in bed. Keeping sleep as the only activity in bed will help re-associate bed equals sleep.  Get up when you cannot  "sleep in 15-20 minutes. When you are unable to sleep, exit the bed, and go to another room or chair in bedroom, do something boring, calm, relaxing, distracting, or non-stimulating in dim lighting until you feel sleepy enough to fall back asleep. Avoid stimulating activity or any triggers for mental thinking (e.g. cellphone). Repeat as needed.  Avoid napping during the day to build up sleep pressure so that it won't be hard for you to fall asleep at night. Napping, particularly in the late afternoon or early evening may interfere with your night's sleep. If naps are necessary, limit naps under 15-20 minutes and not later than 3 PM.   Create a \"buffer zone\" or \"wind-down time\". This is a quiet time prior to bedtime, typically at least 30 minutes and perhaps as long as 1-2 hours. During this time, you should do things that are enjoyable, relaxing, and not necessarily goal-oriented like performing relaxation exercises, listening to relaxing music, reading a boring book, dimming the lights, or eating a light bedtime snack like a glass of milk and banana which can promote sleep. Activities that promote relaxation before sleep is important because these can help quiet the mind and relax the body to get into the right state for sleep.   Do not worry or plan in bed. If you are worrying, planning, feeling anxious, or cannot shut off your thoughts, get up and stay out of bed until you have quieted your mind. Set up a “scheduled worry time” in the morning or late afternoon to write down your worries and stressors as well as plans for the following day.   Keep your bedroom quiet, dark, and cool. Ideal sleeping temperature is 65F. If light bothers you, get a slumber mask or blackout shades. If noise bothers you, put ear plugs or get a white noise machine. Alternatively, you may turn on fan or humidifier to create a constant background noise to eliminate unexpected sounds that would otherwise wake you up in the middle of your " "sleep.  Keep your bedroom technology free. Avoid exposure to TV and electronics 1-2 hours prior to bedtime. May also consider wearing \"blue light blocker\" eyeglasses.  Turn the clock around to avoid clock-watching. Thoughts of the time can cause frustration and make it more difficult to go to sleep.   Other things to avoid to help   Avoid  caffeine (including chocolate) intake in the late afternoon and early evening. Limit the amount of caffeine and consume before noon.   Avoid smoking 2-3 hours before bedtime. Like caffeine, nicotine in cigarette smoking acts a stimulant.   Avoid alcohol intake 2-3 hours before bedtime. Although alcohol may seem to help you fall asleep more easily as it slows down brain activity, it also disrupts your sleep during the night by causing frequent awakenings as the effect of alcohol wears off. Additionally, alcohol worsens snoring and sleep apnea  Avoid eating a heavy meal (high-fat, high-carbohydrate, gas-producing foods) at dinner and 2-3 hours before bedtime.    Avoid drinking more than 8 oz liquids in the evening. Limit fluid intake at 8 oz during dinner. Restrict fluids after dinner. May take sips of water enough to swallow bedtime medications. Void at bedtime.  Avoid physical exercise or hot shower/bath within 2 hours of bedtime. Regular exercise can improve sleep quality, but exercising or having a hot shower/bath too close to bedtime can disrupt sleep onset. The best time to exercise to help sleep is in the late afternoon or early evening but not within 2 hours of bedtime. In order to promote sleep, hot shower/bath can be taken in the evening for 15-20 minutes but not within 2 hours of going to bed.   Avoid sleeping with pets or kids if they appear to bother your sleep and/or sleep quality.  Last but the least, DO NOT TRY TOO HARD TO SLEEP. JUST ALLOW SLEEP TO UNFOLD.    MOST IMPORTANTLY:  BE PATIENT!  BE CONSISTENT!  Your sleep problem developed over time so it will take some " time and consistency to return to a more normal sleep pattern. By following the suggestions listed above, you should see gradual sleep improvements over time.    Also you have been recommended to do Cognitive Behavioral Therapy for Insomnia (CBT-I). CBT-I is widely recognized as an effective treatment for a wide range of insomnias. CBT-I is typically made up of a number of components including assessment, behavioral and cognitive interventions, motivational techniques, and relapse prevention skills. An overwhelming amount of evidence suggests that CBT-I is as effective as hypnotics and the newer non-benzodiazepine sleep aides in the acute treatment and is more effective than medication in the long term treatment of insomnia.     Below are some resources for CBT-I:  CBT-I at  with Analisa [place], NP  GoToSleep- online course via [facility]. Self-guided. One time charge to sign up.  SleepEZ- Free self-guided course from the [facility] https://www.veterantraining.va.gov/insomnia/  Dr. Holt - one on one CBT-I service [email or web address]    You can also go to the following EDUCATION WEBSITES for further information:   American Academy of Sleep Medicine http://sleepeducation.org  National Sleep Foundation: https://sleepfoundation.org  American Sleep Apnea Association: https://www.sleepapnea.org (for patients with sleep apnea)  Narcolepsy Network: https://www.narcolepsynetwork.org (for patients with narcolepsy)  [employer]ake[employer]ole[employer]: https://www.wakeupnarcolepsy.org (for patients with narcolepsy)  Hypersomnia Foundation: https://www.hypersomniafoundation.org (for patients with idiopathic hypersomnia)  RLS foundation: https://www.rls.org (for patients with restless leg syndrome)    IMPORTANT INFORMATION     Call 911 for medical emergencies.  Our offices are generally open from Monday-Friday, 8 am - 5 pm.   There are no supporting services by either the sleep doctors or their staff on weekends and Holidays, or after 5 PM on weekdays.    If you need to get in touch with me, you may either call my office number or you can use link bird.  If a referral for a test, for CPAP, or for another specialist was made, and you have not heard about scheduling this within a week, please call scheduling at [phone] ([phone]).  If you are unable to make your appointment for clinic or an overnight study, kindly call the office or sleep testing center at least 48 hours in advance to cancel and reschedule.  If you are on CPAP, please bring your device's card and/or the device to each clinic appointment.   In case of problems with PAP machine or mask interface, please contact your [employer] ([employer]urable Medical [employer]) company first. DME is the company who provides you the machine and/or PAP supplies.       PRESCRIPTIONS     We require 7 days advanced notice for prescription refills. If we do not receive the request in this time, we cannot guarantee that your medication will be refilled in time.    IMPORTANT PHONE NUMBERS     Sleep Medicine Clinic Fax: [phone]  Appointments (for Pediatric Sleep Clinic): [phone] - option 1  Appointments (for Adult Sleep Clinic): [phone] - option 2  Appointments (For Sleep Studies): [phone] - option 3  Behavioral Sleep Medicine: [phone]  Sleep Surgery: [phone]  Nutrition Service: [phone]  Weight management clinics with endocrinology: [phone]  Bariatric Services: [phone] (includes weight loss medications and weight loss surgery)  [facility]: [phone] (offers holistic approaches to weight management)  ENT (Otolaryngology): [phone]  Headache Clinic (Neurology): [phone]  Neurology: [phone]  Psychiatry: [phone]  Pulmonary Function Testing (PFT) Center: [phone]  Pulmonary Medicine: [phone]  [employer] (DME): [phone]      OUR SLEEP TESTING LOCATIONS     Our team will contact you to schedule your sleep study, however,  "you can contact us as follow:  Main Phone Line (scheduling only): [phone]), option 3  Adult and Pediatric Locations   [place] (6 years and older): [facility] - 4th floor ([address], [facility]) After hours line: [phone]  [facility] (Main campus: All ages): [facility], 6th floor. After hours line: [phone]   [place] (5 years and older; younger considered on case-by-case basis): [address]; [address], [address]. Scheduling  After hours line: [phone]   [place] (6 years and older): [address]; [address]; [address]   [place] (6 years and older): [address] [facility], [address]  After hours line: [phone]   [place] (13 years and older) in [place]: [address], 2nd floor  After hours line: [phone]   [place] (13 year and older): [address]  After hours line: [phone]     Adult Only Locations:   [place] (18 years and older): [address] 2nd floor   [place] (18 years and older): [address] [facility]; 4th floor  After hours line: [phone]   [place] (18 years and older) at [place]: [id number] [place] [address]  After hours line: [phone]     CONTACTING YOUR SLEEP MEDICINE PROVIDER AND SLEEP TEAM      For issues with your machine or mask interface, please call your DME provider first. DME stands for durable medical company. DME is the company who provides you the machine and/or PAP supplies / accessories.   To schedule, cancel, or reschedule SLEEP STUDY APPOINTMENTS, please call the Main Phone Line at [phone] - option 3.   To schedule, cancel, or reschedule CLINIC APPOINTMENTS, you can do it in \"MyChart\", call [phone] to speak with my  (Niya Lyons), or call the Main Phone Line at [phone] - option 2  For CLINICAL QUESTIONS or MEDICATION REFILLS, please call direct line for Adult Sleep " "Nurses at [phone].   Lastly, you can also send a message directly to your provider through \"My Chart\", which is the email service through your  Records Account: [email or web address]       Here at [facility], we wish you a restful sleep!   "

## 2024-06-24 DIAGNOSIS — R09.81 NASAL CONGESTION: ICD-10-CM

## 2024-06-25 ENCOUNTER — TELEPHONE (OUTPATIENT)
Dept: NEUROLOGY | Facility: CLINIC | Age: 59
End: 2024-06-25
Payer: MEDICARE

## 2024-06-27 NOTE — TELEPHONE ENCOUNTER
Spoke with pt.  She reports no change in her MS symptoms, and currently has the same intractable joint pain she was having before, but worse.  Pharmacy apparently misunderstood pt.    I called Dee and advised them to send out the medication.  They indicated they will do so.

## 2024-06-30 RX ORDER — FLUTICASONE PROPIONATE 50 MCG
2 SPRAY, SUSPENSION (ML) NASAL DAILY
Qty: 48 ML | Refills: 1 | Status: SHIPPED | OUTPATIENT
Start: 2024-06-30 | End: 2024-09-28

## 2024-07-08 ENCOUNTER — OFFICE VISIT (OUTPATIENT)
Dept: DERMATOLOGY | Facility: HOSPITAL | Age: 59
End: 2024-07-08
Payer: MEDICARE

## 2024-07-08 DIAGNOSIS — L29.9 PRURITUS: ICD-10-CM

## 2024-07-08 DIAGNOSIS — L21.9 SEBORRHEIC DERMATITIS: Primary | ICD-10-CM

## 2024-07-08 PROCEDURE — 99214 OFFICE O/P EST MOD 30 MIN: CPT | Performed by: DERMATOLOGY

## 2024-07-08 PROCEDURE — 1036F TOBACCO NON-USER: CPT | Performed by: DERMATOLOGY

## 2024-07-08 RX ORDER — BETAMETHASONE DIPROPIONATE 0.5 MG/G
LOTION TOPICAL 2 TIMES DAILY
Qty: 60 ML | Refills: 3 | Status: SHIPPED | OUTPATIENT
Start: 2024-07-08

## 2024-07-08 ASSESSMENT — DERMATOLOGY PATIENT ASSESSMENT
ARE YOU ON BIRTH CONTROL: NO
HAVE YOU HAD OR DO YOU HAVE A STAPH INFECTION: NO
ARE YOU TRYING TO GET PREGNANT: NO
DO YOU HAVE ANY NEW OR CHANGING LESIONS: NO
DO YOU USE A TANNING BED: NO
DO YOU HAVE IRREGULAR MENSTRUAL CYCLES: NO
ARE YOU AN ORGAN TRANSPLANT RECIPIENT: NO
HAVE YOU HAD OR DO YOU HAVE VASCULAR DISEASE: NO

## 2024-07-08 ASSESSMENT — DERMATOLOGY QUALITY OF LIFE (QOL) ASSESSMENT
ARE THERE EXCLUSIONS OR EXCEPTIONS FOR THE QUALITY OF LIFE ASSESSMENT: NO
RATE HOW BOTHERED YOU ARE BY SYMPTOMS OF YOUR SKIN PROBLEM (EG, ITCHING, STINGING BURNING, HURTING OR SKIN IRRITATION): 1
DATE THE QUALITY-OF-LIFE ASSESSMENT WAS COMPLETED: 67029
RATE HOW BOTHERED YOU ARE BY EFFECTS OF YOUR SKIN PROBLEMS ON YOUR ACTIVITIES (EG, GOING OUT, ACCOMPLISHING WHAT YOU WANT, WORK ACTIVITIES OR YOUR RELATIONSHIPS WITH OTHERS): 1
RATE HOW EMOTIONALLY BOTHERED YOU ARE BY YOUR SKIN PROBLEM (FOR EXAMPLE, WORRY, EMBARRASSMENT, FRUSTRATION): 1

## 2024-07-08 ASSESSMENT — ITCH NUMERIC RATING SCALE
HOW SEVERE IS YOUR ITCHING?: 0
HOW SEVERE IS YOUR ITCHING?: 5

## 2024-07-08 ASSESSMENT — PATIENT GLOBAL ASSESSMENT (PGA): PATIENT GLOBAL ASSESSMENT: PATIENT GLOBAL ASSESSMENT:  1 - CLEAR

## 2024-07-08 NOTE — PROGRESS NOTES
Subjective     Genny Grant is a 59 y.o. female who presents for the following: Dermatitis.     Last derm visit 5/29/24 for dpn removal. She is happy with how procedure went    She is here today for dermatitis of scalp and generalized pruritus - last visit was 2/12/24 for this - checked labs, encouraged to take neurontin more regularly    Today the pruritus is more focused on scalp, seems to be flaring with summer, ketoconazole shampoo is not helping          Review of Systems:  No other skin or systemic complaints other than what is documented elsewhere in the note.    The following portions of the chart were reviewed this encounter and updated as appropriate:   Tobacco  Allergies  Meds  Problems  Med Hx  Surg Hx         Skin Cancer History  No skin cancer on file.      Specialty Problems          Dermatology Problems    Other seborrheic dermatitis    Telogen effluvium    Contusion of multiple sites        Objective   Well appearing patient in no apparent distress; mood and affect are within normal limits.    A focused skin examination was performed. All findings within normal limits unless otherwise noted below.    Assessment/Plan   1. Seborrheic dermatitis  Scalp  Light pink erythema with scale on occipital and frontal scalp    - seasonal flares can happen  - add topical steroid as below    betamethasone dipropionate (Diprosone) 0.05 % lotion - Scalp  Apply topically 2 times a day. To itchy areas of scalp for up to 2 weeks at a time, wait at least 2 weeks before repeating as needed for flares    Related Procedures  Follow Up In Dermatology - Established Patient    2. Pruritus  No other primary skin lesions on exam    For her generalized pruritus, not as severe today  Cr did bump on last blood draw 6/2024 as compared to January. Her Hb is still normal. Follow up with PCP     Related Procedures  Follow Up In Dermatology - Established Patient        Follow up 3 months for seb derm

## 2024-07-17 ENCOUNTER — OFFICE VISIT (OUTPATIENT)
Dept: CARDIOLOGY | Facility: CLINIC | Age: 59
End: 2024-07-17
Payer: MEDICARE

## 2024-07-17 VITALS
HEART RATE: 70 BPM | BODY MASS INDEX: 26.16 KG/M2 | SYSTOLIC BLOOD PRESSURE: 110 MMHG | HEIGHT: 65 IN | WEIGHT: 157 LBS | DIASTOLIC BLOOD PRESSURE: 72 MMHG | OXYGEN SATURATION: 94 %

## 2024-07-17 DIAGNOSIS — R07.89 ATYPICAL CHEST PAIN: Primary | ICD-10-CM

## 2024-07-17 DIAGNOSIS — R55 SYNCOPE AND COLLAPSE: ICD-10-CM

## 2024-07-17 DIAGNOSIS — R79.89 TROPONIN LEVEL ELEVATED: ICD-10-CM

## 2024-07-17 DIAGNOSIS — R00.2 HEART PALPITATIONS: ICD-10-CM

## 2024-07-17 DIAGNOSIS — R06.02 SHORTNESS OF BREATH ON EXERTION: ICD-10-CM

## 2024-07-17 DIAGNOSIS — I10 BENIGN ESSENTIAL HTN: ICD-10-CM

## 2024-07-17 DIAGNOSIS — I47.10 PAROXYSMAL SVT (SUPRAVENTRICULAR TACHYCARDIA) (CMS-HCC): ICD-10-CM

## 2024-07-17 PROCEDURE — 3074F SYST BP LT 130 MM HG: CPT | Performed by: STUDENT IN AN ORGANIZED HEALTH CARE EDUCATION/TRAINING PROGRAM

## 2024-07-17 PROCEDURE — 3008F BODY MASS INDEX DOCD: CPT | Performed by: STUDENT IN AN ORGANIZED HEALTH CARE EDUCATION/TRAINING PROGRAM

## 2024-07-17 PROCEDURE — 1036F TOBACCO NON-USER: CPT | Performed by: STUDENT IN AN ORGANIZED HEALTH CARE EDUCATION/TRAINING PROGRAM

## 2024-07-17 PROCEDURE — 99214 OFFICE O/P EST MOD 30 MIN: CPT | Performed by: STUDENT IN AN ORGANIZED HEALTH CARE EDUCATION/TRAINING PROGRAM

## 2024-07-17 PROCEDURE — 3078F DIAST BP <80 MM HG: CPT | Performed by: STUDENT IN AN ORGANIZED HEALTH CARE EDUCATION/TRAINING PROGRAM

## 2024-07-17 RX ORDER — DICLOFENAC SODIUM 1 MG/ML
SOLUTION/ DROPS OPHTHALMIC
COMMUNITY
Start: 2024-07-08

## 2024-07-17 NOTE — PROGRESS NOTES
Follow up    HPI:    Genny Grant is a 59 y.o. female with pertinent history of family history of premature coronary artery disease, paroxysmal supraventricular tachycardia, palpitations, hypertension, dyslipidemia, multiple sclerosis, breast cancer with recent chemotherapy and upcoming radiation, history of syncopal episode, low normal ejection fraction with impaired relaxation on echo performed 9/17/2021, no clear ischemia on nuclear stress test performed 8/15/2017 or 1/31/2020, coronary calcium score of 0.4 9/23/2021, preserved ejection fraction with impaired relaxation echo performed 10/19/2022, no clear ischemia nuclear stress test performed 3/15/2023, CT calcium score of 0 performed 10/12/2023 presents for follow-up.    She is doing relatively well.  Dizziness has improved.  Blood pressure is well-controlled.  We discussed her prior CT calcium score and stress test.  She continues to have some sporadic nonexertional chest discomfort.  No exacerbating or relieving factors.  Pt denies orthopnea, and paroxysmal nocturnal dyspnea.  Pt denies worsening lower extremity edema.  Pt denies palpitations or syncope.  No recent falls.  No fever or chills.  No cough.  No change in bowel or bladder habits.  No sick contacts.  No recent travel.    12 point review of systems including (Constitutional, Eyes, ENMT, Respiratory, Cardiac, Gastrointestinal, Neurological, Psychiatric, and Hematologic) was performed and is otherwise negative.    Past medical history reviewed:   has a past medical history of Hypersomnia, unspecified (09/10/2019), Multiple sclerosis (Multi), Personal history of other endocrine, nutritional and metabolic disease, and Personal history of other specified conditions (01/04/2018).    Past surgical history reviewed    Social history reviewed:   reports that she has never smoked. She has never used smokeless tobacco. She reports that she does not drink alcohol and does not use drugs.     Family  history reviewed:    Family History   Problem Relation Name Age of Onset    Alzheimer's disease Mother      Other (Malignant neoplasm) Mother      Sleep apnea Mother      Other (Malignant neoplasm) Father         Allergies reviewed: Adhesive, Diphenhydramine, Gadolinium-containing contrast media, Penicillins, and Pollen extracts     Medications reviewed:   Current Outpatient Medications   Medication Instructions    aspirin 81 mg, oral    atorvastatin (LIPITOR) 10 mg, oral    baclofen (LIORESAL) 20 mg, oral, 3 times daily    betamethasone dipropionate (Diprosone) 0.05 % lotion Topical, 2 times daily, To itchy areas of scalp for up to 2 weeks at a time, wait at least 2 weeks before repeating as needed for flares    bethanechol (URECHOLINE) 50 mg, oral    bimatoprost (Latisse) 0.03 % ophthalmic solution Apply daily to areas of decreased hair on eyebrows    capsicum (Zostrix) 0.075 % topical cream 1 Application, Topical, PER DIRECTED    chlorthalidone (HYGROTON) 25 mg, oral, Daily    ciclopirox (Penlac) 8 % solution APPLY TO AFFECTED AREA DAILY AT BEDTIME. REMOVE FILM FROM MEDICATION ONCE WEEKLY WITH ALCOHOL SWAB    clindamycin (Cleocin T) 1 % lotion 1 Application, Topical, 2 times daily, apply 1 application twice a day topically to red bumps on skin    cycloSPORINE (Restasis MultiDose) 0.05 % drops 1 drop, Both Eyes, 2 times daily    dimethyl fumarate (TECFIDERA) 240 mg, oral, 2 times daily    DULoxetine (CYMBALTA) 60 mg, oral    ergocalciferol (Vitamin D-2) 1.25 MG (05810 UT) capsule 1 capsule, oral, Once Weekly    fluticasone (Flonase) 50 mcg/actuation nasal spray 2 sprays, Each Nostril, Daily    furosemide (Lasix) 40 mg tablet 1 tablet, oral, Daily RT    gabapentin (Neurontin) 100 mg capsule 1 capsule, oral, 3 times daily    heparin flush (heparin LockFlush,Porcine,,PF,) 10 unit/mL injection 5 mL, intravenous, Daily    hydrocortisone 1 % ointment Topical, DO PER DIRECTED    hydrOXYzine HCL (Atarax) 25 mg tablet oral  "   ibandronate (BONIVA) 150 mg, oral, Every 30 days, Take in morning with full glass of water on an empty stomach. No food, drink, meds, or lying down for 60 minutes after.    ibuprofen 600 mg tablet oral    ketoconazole (NIZOral) 2 % shampoo Topical, Once Weekly, Massage into scalp and let sit 5-10 minutes before rinsing    lidocaine (Lidoderm) 5 % patch APPLY 1 PATCH TO INTACT SKIN REMOVE AFTER 12 HOURS EXTERNALLY ONCE A DAY FOR 30 DAYS    loratadine (CLARITIN) 10 mg, oral, Daily    multivitamin tablet 1 tablet, oral, Daily    pantoprazole (PROTONIX) 40 mg, oral    potassium chloride CR (Klor-Con M20) 20 mEq ER tablet 1 tablet, oral, Daily    propranolol XL (Innopran XL) 80 mg 24 hr capsule 1 capsule, oral, Daily    Saline NasaL 0.65 % nasal spray PLACE 2 DROPS IN BOTH NOSTRILS EVERY 2 HOURS AS NEEDED    simethicone (Mylicon) 125 mg chewable tablet 1 tablet, oral, Every 6 hours PRN    sodium chloride 0.9% (sodium chloride) flush 10 mL, intravenous, Daily    sucralfate (Carafate) 1 gram tablet TAKE 1 TABLET BY MOUTH TWICE A DAY ON AN EMPTY STOMACH    tiZANidine (Zanaflex) 4 mg tablet 1 tablet, oral, 3 times daily, TAKE 1 TABLET BY MOUTH 3 TIMES DAILY AS NEEDED FOR BACK AND LEG CRAMPS    traMADol (ULTRAM) 50 mg, oral, Every 8 hours PRN    traZODone (DESYREL) 100 mg, oral, Nightly    valsartan (Diovan) 40 mg tablet 1 tablet, oral, 2 times daily         Visit Vitals  /72   Pulse 70   Ht 1.651 m (5' 5\")   Wt 71.2 kg (157 lb)   SpO2 94%   BMI 26.13 kg/m²   Smoking Status Never   BSA 1.81 m²         Physical Exam:   General:  Patient is awake, alert, and oriented.  Patient is in no acute distress.  HEENT:  Pupils equal and reactive.  Normocephalic.  Moist mucosa.    Neck:  No thyromegaly.  Normal Jugular Venous Pressure.  Cardiovascular:  Regular rate and rhythm.  Normal S1 and S2.  1/6 SCARLET.  Pulmonary:  Clear to auscultation bilaterally.  Abdomen:  Soft. Non-tender.   Non-distended.  Positive bowel sounds.  Lower " Extremities:  2+ pedal pulses. No LE edema.  Neurologic:  Cranial nerves intact.  No focal deficit.   Skin: Skin warm and dry, normal skin turgor.   Psychiatric: Normal affect.    Last Labs:  CBC -      Lab Results   Component Value Date    WBC 6.3 11/18/2021    HGB 13.8 11/18/2021    HCT 41.1 11/18/2021     11/18/2021        CMP-  Lab Results   Component Value Date    GLUCOSE 101 (H) 06/22/2022     06/22/2022    K 3.7 06/22/2022    CL 99 06/22/2022    CO2 32 06/22/2022    ANIONGAP 11 06/22/2022    BUN 16 06/22/2022    CREATININE 0.69 06/22/2022    CALCIUM 9.8 06/22/2022    PROT 7.3 06/22/2022    ALBUMIN 4.2 06/22/2022    AST 13 06/22/2022    ALT 18 06/22/2022    ALKPHOS 83 06/22/2022    BILITOT 1.3 (H) 06/22/2022        LIPIDS-  Lab Results   Component Value Date    CHOL 143 11/18/2021    TRIG 75 11/18/2021    HDL 45.1 11/18/2021    CHHDL 3.2 11/18/2021    VLDL 15 11/18/2021        OTHERS-  Lab Results   Component Value Date    HGBA1C 6.1 (H) 05/26/2022        I personally reviewed the patient's recent vitals, labs, medications, orders, EKGs, pertinent cardiac imaging/ echocardiography and ischemic evaluations including stress testing/ cardiac catheterization.    Assessment and Plan:    Genny Grant is a 59 y.o. female with pertinent history of family history of premature coronary artery disease, paroxysmal supraventricular tachycardia, palpitations, hypertension, dyslipidemia, multiple sclerosis, breast cancer with recent chemotherapy and upcoming radiation, history of syncopal episode, low normal ejection fraction with impaired relaxation on echo performed 9/17/2021, no clear ischemia on nuclear stress test performed 8/15/2017 or 1/31/2020, coronary calcium score of 0.4 9/23/2021, preserved ejection fraction with impaired relaxation echo performed 10/19/2022, no clear ischemia nuclear stress test performed 3/15/2023, CT calcium score of 0 performed 10/12/2023 presents for follow-up.  She is  doing relatively well.  Dizziness has improved.  Blood pressure is well-controlled.  We discussed her prior CT calcium score and stress test.  She continues to have some sporadic nonexertional chest discomfort.      Please continue remaining cardiac medications including aspirin 81 mg, atorvastatin 10 mg daily, chlorthalidone 25 mg daily, valsartan 40 mg twice daily, propranolol 80 mg daily.    We will obtain a transthoracic echocardiogram for structural evaluation including ejection fraction, assessment of regional wall motion abnormalities or valvular disease, and further evaluation of hemodynamics.    Please followup with me in Cardiology clinic within the next 1 year.  Please return to clinic sooner or seek emergent care if your symptoms reoccur or worsen.    Thank you for allowing me to participate in their care.  Please feel free to call me with any further questions or concerns.    Amrit Campbell MD, FACC, [facility]  Division of Cardiovascular Medicine  System Director, Nuclear Cardiology   Medical Director, [facility] [place], [facility]   Clinical , [employer]  [email or web address]   Office:  [phone]

## 2024-10-14 ENCOUNTER — OFFICE VISIT (OUTPATIENT)
Dept: DERMATOLOGY | Facility: HOSPITAL | Age: 59
End: 2024-10-14
Payer: MEDICARE

## 2024-10-14 DIAGNOSIS — L66.10 LICHEN PLANOPILARIS: Primary | ICD-10-CM

## 2024-10-14 DIAGNOSIS — L20.89 FLEXURAL ATOPIC DERMATITIS: ICD-10-CM

## 2024-10-14 PROCEDURE — 99214 OFFICE O/P EST MOD 30 MIN: CPT | Performed by: DERMATOLOGY

## 2024-10-14 PROCEDURE — 1036F TOBACCO NON-USER: CPT | Performed by: DERMATOLOGY

## 2024-10-14 RX ORDER — BETAMETHASONE DIPROPIONATE 0.5 MG/G
LOTION TOPICAL 2 TIMES DAILY
Qty: 60 ML | Refills: 3 | Status: SHIPPED | OUTPATIENT
Start: 2024-10-14

## 2024-10-14 RX ORDER — HYDROCORTISONE 25 MG/G
CREAM TOPICAL 2 TIMES DAILY
Qty: 30 G | Refills: 3 | Status: SHIPPED | OUTPATIENT
Start: 2024-10-14

## 2024-10-14 ASSESSMENT — DERMATOLOGY QUALITY OF LIFE (QOL) ASSESSMENT
RATE HOW EMOTIONALLY BOTHERED YOU ARE BY YOUR SKIN PROBLEM (FOR EXAMPLE, WORRY, EMBARRASSMENT, FRUSTRATION): 1
WHAT SINGLE SKIN CONDITION LISTED BELOW IS THE PATIENT ANSWERING THE QUALITY-OF-LIFE ASSESSMENT QUESTIONS ABOUT: DERMATITIS
DATE THE QUALITY-OF-LIFE ASSESSMENT WAS COMPLETED: 67127
RATE HOW BOTHERED YOU ARE BY SYMPTOMS OF YOUR SKIN PROBLEM (EG, ITCHING, STINGING BURNING, HURTING OR SKIN IRRITATION): 1
ARE THERE EXCLUSIONS OR EXCEPTIONS FOR THE QUALITY OF LIFE ASSESSMENT: NO
RATE HOW BOTHERED YOU ARE BY EFFECTS OF YOUR SKIN PROBLEMS ON YOUR ACTIVITIES (EG, GOING OUT, ACCOMPLISHING WHAT YOU WANT, WORK ACTIVITIES OR YOUR RELATIONSHIPS WITH OTHERS): 1

## 2024-10-14 ASSESSMENT — DERMATOLOGY PATIENT ASSESSMENT
DO YOU HAVE IRREGULAR MENSTRUAL CYCLES: NO
HAVE YOU HAD OR DO YOU HAVE A STAPH INFECTION: NO
ARE YOU AN ORGAN TRANSPLANT RECIPIENT: NO
DO YOU USE A TANNING BED: NO
HAVE YOU HAD OR DO YOU HAVE VASCULAR DISEASE: NO
DO YOU HAVE ANY NEW OR CHANGING LESIONS: NO
ARE YOU TRYING TO GET PREGNANT: NO
ARE YOU ON BIRTH CONTROL: NO

## 2024-10-14 ASSESSMENT — ITCH NUMERIC RATING SCALE: HOW SEVERE IS YOUR ITCHING?: 0

## 2024-10-14 ASSESSMENT — PATIENT GLOBAL ASSESSMENT (PGA): PATIENT GLOBAL ASSESSMENT: PATIENT GLOBAL ASSESSMENT:  1 - CLEAR

## 2024-10-14 NOTE — PROGRESS NOTES
Subjective     Genny Grant is a 59 y.o. female who presents for the following: Dermatitis. Last derm visit 7/8/24 for seb derm and pruritus. She has also had generalized pruritus in the past, labs checked 2/2024 and encouraged to take neurontin more regularly.     Using ketoconazole shampoo every 2 weeks and betamethasone lotion. Her scalp is still very itchy and tender.     Review of Systems:  No other skin or systemic complaints other than what is documented elsewhere in the note.    The following portions of the chart were reviewed this encounter and updated as appropriate:   Tobacco  Allergies  Meds  Problems  Med Hx  Surg Hx         Skin Cancer History  No skin cancer on file.      Specialty Problems          Dermatology Problems    Other seborrheic dermatitis    Telogen effluvium    Contusion of multiple sites        Objective   Well appearing patient in no apparent distress; mood and affect are within normal limits.    A focused skin examination was performed. All findings within normal limits unless otherwise noted below.    Assessment/Plan   1. Lichen planopilaris  Scalp  Follicular prominence now on crown of scalp with some loss of frontal hairline as compared to photo 10/2023 with follicular preservation. No vellus hairs    - lichen planopilaris is now favored  - discussed scalp biopsy and hesitant at this time  - has not been using betamethasone - will restart for 2 month burst and then reconsider  - did have itching of eyebrows, this is consistent with diagnosis        Related Procedures  Follow Up In Dermatology - Established Patient  Follow Up In Dermatology - Established Patient    Related Medications  betamethasone dipropionate (Diprosone) 0.05 % lotion  Apply topically 2 times a day. To itchy areas of scalp for 2 months then decrease to MWF for maintenance    2. Flexural atopic dermatitis  Head - Anterior (Face)  Fading eczematous patches on face    Body surface area:  1%        -Discussed the nature of condition  - stop the apple cider vinegar now  - hydrocortisone      hydrocortisone 2.5 % cream - Head - Anterior (Face)  Apply topically 2 times a day. To dry patches on face for up to 2 weeks at a time. Do not use more than 14 days per month.        Follow up 3 months dermatitis on scalp

## 2024-10-21 ENCOUNTER — APPOINTMENT (OUTPATIENT)
Dept: NEUROLOGY | Facility: CLINIC | Age: 59
End: 2024-10-21
Payer: MEDICARE

## 2024-10-21 VITALS
TEMPERATURE: 96.8 F | HEIGHT: 65 IN | BODY MASS INDEX: 26.49 KG/M2 | WEIGHT: 159 LBS | DIASTOLIC BLOOD PRESSURE: 58 MMHG | SYSTOLIC BLOOD PRESSURE: 90 MMHG | HEART RATE: 74 BPM

## 2024-10-21 DIAGNOSIS — F51.01 PRIMARY INSOMNIA: ICD-10-CM

## 2024-10-21 DIAGNOSIS — M19.90 ARTHRITIS: ICD-10-CM

## 2024-10-21 DIAGNOSIS — G35 MULTIPLE SCLEROSIS (MULTI): Primary | ICD-10-CM

## 2024-10-21 DIAGNOSIS — S39.012D STRAIN OF LUMBAR REGION, SUBSEQUENT ENCOUNTER: ICD-10-CM

## 2024-10-21 DIAGNOSIS — S16.1XXD STRAIN OF NECK MUSCLE, SUBSEQUENT ENCOUNTER: ICD-10-CM

## 2024-10-21 PROCEDURE — 3074F SYST BP LT 130 MM HG: CPT | Performed by: PSYCHIATRY & NEUROLOGY

## 2024-10-21 PROCEDURE — 1036F TOBACCO NON-USER: CPT | Performed by: PSYCHIATRY & NEUROLOGY

## 2024-10-21 PROCEDURE — 99214 OFFICE O/P EST MOD 30 MIN: CPT | Performed by: PSYCHIATRY & NEUROLOGY

## 2024-10-21 PROCEDURE — 3008F BODY MASS INDEX DOCD: CPT | Performed by: PSYCHIATRY & NEUROLOGY

## 2024-10-21 PROCEDURE — 3078F DIAST BP <80 MM HG: CPT | Performed by: PSYCHIATRY & NEUROLOGY

## 2024-10-21 NOTE — PROGRESS NOTES
NEUROLOGY OUTPATIENT FOLLOW-UP NOTE    Assessment/Plan   Diagnoses and all orders for this visit:  Multiple sclerosis (Multi)  -     Cane  Strain of neck muscle, subsequent encounter  Primary insomnia  Strain of lumbar region, subsequent encounter  -     Miscellaneous DME  Arthritis  -     Referral to Physical Therapy; Future  -     Cane  -     Referral to Rheumatology; Future      IMPRESSION:  Stable multiple sclerosis.  Non-neurological hip and SI joint pain likely referable to arthritis.  Cervical and lumbar strain which are separate from the MS.     PLAN:  Continue dimethyl fumarate daily.  Continue baclofen and will consider changing tizanidine to methocarbamol.  Advised to follow up with her PCP regarding the joint pain, but in the meantime I referred to Rheum as this is the problem that bothers her the most today.  I sent her to PT for gait training and a home exercise regimen to help her joint pain.  I ordered a soft lumbar brace to help the low back pain.  I will see her again in six months or prn for the MS.      Phani Youngblood Jr., M.D., FAAN   ----------    Subjective     Genny MORALES Chalo Grant is a 59 y.o. year old female here for follow-up.    2-3 weeks ago, she had sudden onset of left arm dystonia while raising the arm, and it remained stuck in elbow flexion for a couple of minutes at most.  She notes occasional loss of sensation of the toes and fingers.    She otherwise denies new focal neurological symptoms including dysarthria, dysphagia, diplopia, focal weakness, otherwise focal sensory change, ataxia, vertigo, or bowel/bladder incontinence, among others.      She still has the hip and low back pain that keep her from ambulating.  She hasn't been able to see a rheumatologist, and according to her, her PCP hasn't added new treatment for this problem.  She is in need of a new cane for ambulation.    Past Medical History:   Diagnosis Date    Hypersomnia, unspecified 09/10/2019    Hypersomnia     Multiple sclerosis (Multi)     History of multiple sclerosis    Personal history of other endocrine, nutritional and metabolic disease     History of high cholesterol    Personal history of other specified conditions 01/04/2018    History of insomnia     No past surgical history on file.  Social History     Tobacco Use    Smoking status: Never    Smokeless tobacco: Never   Substance Use Topics    Alcohol use: Never     family history includes Alzheimer's disease in her mother; Malignant neoplasm in her father and mother; Sleep apnea in her mother.    Current Outpatient Medications:     aspirin 81 mg EC tablet, Take 1 tablet (81 mg) by mouth., Disp: , Rfl:     atorvastatin (Lipitor) 10 mg tablet, Take 1 tablet (10 mg) by mouth., Disp: , Rfl:     baclofen (Lioresal) 20 mg tablet, TAKE 1 TABLET BY MOUTH THREE TIMES DAILY, Disp: 90 tablet, Rfl: 11    betamethasone dipropionate (Diprosone) 0.05 % lotion, Apply topically 2 times a day. To itchy areas of scalp for 2 months then decrease to MWF for maintenance, Disp: 60 mL, Rfl: 3    bethanechol (Urecholine) 50 mg tablet, Take 1 tablet (50 mg) by mouth., Disp: , Rfl:     bimatoprost (Latisse) 0.03 % ophthalmic solution, Apply daily to areas of decreased hair on eyebrows, Disp: 5 mL, Rfl: 3    capsicum (Zostrix) 0.075 % topical cream, Apply 1 Application topically. PER DIRECTED, Disp: , Rfl:     chlorthalidone (Hygroton) 25 mg tablet, TAKE 1 TABLET BY MOUTH EVERY DAY, Disp: 90 tablet, Rfl: 3    ciclopirox (Penlac) 8 % solution, APPLY TO AFFECTED AREA DAILY AT BEDTIME. REMOVE FILM FROM MEDICATION ONCE WEEKLY WITH ALCOHOL SWAB, Disp: , Rfl:     clindamycin (Cleocin T) 1 % lotion, Apply 1 Application topically 2 times a day. apply 1 application twice a day topically to red bumps on skin, Disp: , Rfl:     cycloSPORINE (Restasis MultiDose) 0.05 % drops, Administer 1 drop into both eyes 2 times a day., Disp: , Rfl:     diclofenac (Voltaren) 0.1 % ophthalmic solution, INSTILL 1  DROP IN EACH EYE ONCE DAILY, Disp: , Rfl:     dimethyl fumarate (Tecfidera) 240 mg capsule,delayed release(DR/EC) capsule, Take 1 capsule (240 mg) by mouth 2 times a day., Disp: 180 capsule, Rfl: 3    DULoxetine (Cymbalta) 60 mg DR capsule, Take 1 capsule (60 mg) by mouth., Disp: , Rfl:     ergocalciferol (Vitamin D-2) 1.25 MG (70435 UT) capsule, Take 1 capsule (1,250 mcg) by mouth 1 (one) time per week., Disp: , Rfl:     fluticasone (Flonase) 50 mcg/actuation nasal spray, ADMINISTER 2 SPRAYS INTO EACH NOSTRIL ONCE DAILY., Disp: 48 mL, Rfl: 1    furosemide (Lasix) 40 mg tablet, Take 1 tablet (40 mg) by mouth once daily., Disp: , Rfl:     gabapentin (Neurontin) 100 mg capsule, Take 1 capsule (100 mg) by mouth 3 times a day., Disp: , Rfl:     heparin flush (heparin LockFlush,Porcine,,PF,) 10 unit/mL injection, Infuse 5 mL (50 Units) into a venous catheter once daily., Disp: , Rfl:     hydrocortisone 2.5 % cream, Apply topically 2 times a day. To dry patches on face for up to 2 weeks at a time. Do not use more than 14 days per month., Disp: 30 g, Rfl: 3    hydrOXYzine HCL (Atarax) 25 mg tablet, Take by mouth., Disp: , Rfl:     ibandronate (Boniva) 150 mg tablet, Take 1 tablet (150 mg) by mouth every 30 (thirty) days. Take in morning with full glass of water on an empty stomach. No food, drink, meds, or lying down for 60 minutes after., Disp: , Rfl:     ibuprofen 600 mg tablet, Take by mouth., Disp: , Rfl:     ketoconazole (NIZOral) 2 % shampoo, Apply topically 1 (one) time per week. Massage into scalp and let sit 5-10 minutes before rinsing, Disp: 120 mL, Rfl: 11    lidocaine (Lidoderm) 5 % patch, APPLY 1 PATCH TO INTACT SKIN REMOVE AFTER 12 HOURS EXTERNALLY ONCE A DAY FOR 30 DAYS, Disp: , Rfl:     loratadine (Claritin) 10 mg tablet, Take 1 tablet (10 mg) by mouth once daily., Disp: , Rfl:     multivitamin tablet, Take 1 tablet by mouth once daily., Disp: , Rfl:     pantoprazole (ProtoNix) 40 mg EC tablet, Take 1  "tablet (40 mg) by mouth., Disp: , Rfl:     potassium chloride CR (Klor-Con M20) 20 mEq ER tablet, Take 1 tablet (20 mEq) by mouth once daily., Disp: , Rfl:     propranolol XL (Innopran XL) 80 mg 24 hr capsule, Take 1 capsule (80 mg) by mouth once daily., Disp: , Rfl:     Saline NasaL 0.65 % nasal spray, PLACE 2 DROPS IN BOTH NOSTRILS EVERY 2 HOURS AS NEEDED, Disp: , Rfl:     simethicone (Mylicon) 125 mg chewable tablet, Chew 1 tablet (125 mg) every 6 hours if needed., Disp: , Rfl:     sodium chloride 0.9% (sodium chloride) flush, Infuse 10 mL into a venous catheter once daily., Disp: , Rfl:     sucralfate (Carafate) 1 gram tablet, TAKE 1 TABLET BY MOUTH TWICE A DAY ON AN EMPTY STOMACH, Disp: , Rfl:     tiZANidine (Zanaflex) 4 mg tablet, Take 1 tablet (4 mg) by mouth 3 times a day. TAKE 1 TABLET BY MOUTH 3 TIMES DAILY AS NEEDED FOR BACK AND LEG CRAMPS, Disp: , Rfl:     traMADol (Ultram) 50 mg tablet, Take 1 tablet (50 mg) by mouth every 8 hours if needed., Disp: , Rfl:     traZODone (Desyrel) 50 mg tablet, Take 2 tablets (100 mg) by mouth once daily at bedtime., Disp: 60 tablet, Rfl: 5    valsartan (Diovan) 40 mg tablet, Take 1 tablet (40 mg) by mouth 2 times a day., Disp: , Rfl:   Allergies   Allergen Reactions    Adhesive Unknown    Diphenhydramine Unknown    Gadolinium-Containing Contrast Media Other     Tongue/Lip tingling    Penicillins Unknown    Pollen Extracts Unknown     Hayfever, nasal congestion, headache, cough       Objective     BP 90/58   Pulse 74   Temp 36 °C (96.8 °F)   Ht 1.651 m (5' 5\")   Wt 72.1 kg (159 lb)   BMI 26.46 kg/m²     CONSTITUTIONAL:  No acute distress     EXTREMITIES:  Focal tenderness of the SI joints and hips bilaterally, much more so on the left.    SPINE:  tightness of the cervical and lumbar paraspinals.     MENTAL STATUS:  Awake, alert, fully oriented to self, place, and time, with present short-term memory, good awareness of recent events, normal attention span, " concentration, and fund of knowledge.     SPEECH AND LANGUAGE:  Can name and repeat, follows all commands, has no dysarthria     CRANIAL NERVES:  II-Vision present, visual fields full to confrontational testing     III/IV/VI--EOMs are present in all directions.  Pupils are symmetrically reactive in dim light.  No ptosis.     V--Normal facial sensation.     VII--No facial asymmetry.     VIII--Hearing present to voice bilaterally.     IX/X--Symmetric soft palate rise.     XI--Normal trapezius power bilaterally.     XII--Tongue protrudes without deviation.     MOTOR:  Normal power, tone, and bulk in both arms and both legs.     SENSORY:  Reduced pin sensation in a stocking distribution from the feet to the mid-shins (stable).      COORDINATION:  Normal finger-to-nose and heel-to-shin testing in both arms and both legs.     REFLEXES are normal and symmetric at the biceps, triceps, brachioradialis, patella, and ankle.  The plantar responses are flexor.     GAIT is antalgic due to bilateral hip pain and low back pain.  She has no steppage, ataxia, shuffling, or spasticity.  Gait is more stable with a single-post cane.    MRI at [facility] 8/2024    IMPRESSION:    Lumbar spondylosis as described worst at L3-L4 with moderate canal  narrowing.  Small amount of edema in the left L4 and L5 pedicles, likely  mechanical stress.    Anatomic Lumbar Variant: Transitional L5 vertebral body.  L4-5 is  considered the level of the iliac crest and there are 5 lumbar-type  vertebrae.   If surgery is to be considered, correlation for level with  plain radiographs recommended.    Transcribed Using Voice Recognition  Transcribe Date/Time: Aug 14 2024 12:28P    Dictated by: NOHEMI ANDERSON MD    This examination was interpreted and the report reviewed and  electronically signed by:  NOHEMI ANDERSON MD on Aug 14 2024 12:33PM  EST  Narrative    * * *Final Report* * *    DATE OF EXAM: Aug 14 2024 12:27PM      SPM   0302  -  MRI LUMBAR SPINE W IVCON  /  ACCESSION #  [id number]    PROCEDURE REASON: m54.9        * * * * Physician Interpretation * * * *    RESULT: EXAMINATION:  MRI LUMBAR SPINE W IVCON    CLINICAL HISTORY:  m54.9      TECHNIQUE: Routine lumbosacral spine MR protocol without and with  intravenous gadolinium.   MQ:  [id number]    Contrast:  Dotarem.  Contrast Dose:  15 cc  Route of Administration:  IV    COMPARISON: CT abdomen and pelvis 05/23/2022      RESULT:      Counting reference:  Lumbosacral junction.  For the purposes of this  report,  L4-5 is considered the level of the iliac crest and there are 5  lumbar-type vertebrae.  Anatomic variant: Transitional L5 vertebral body.    Localizer images:  No significant findings.    Alignment:    Alignment is anatomic.    Bone marrow signal/fracture: Small amount of edema in the left L4 and L5  pedicles, likely mechanical stress.  No evidence of pathologic marrow  infiltration.  No evidence of prior fracture.    Conus:  The conus is within normal limits of signal intensity and  morphology.  No abnormal leptomeningeal enhancement.    Paraspinal soft tissues:   Paraspinal soft tissues are within normal  limits.    T10-T11, T11-T12, T12-L1:  Visualized lower thoracic canal and foramina  are patent.  Mild diffuse disc bulges noted.    L1-L2:    Canal and foramina are patent.  Disc degeneration with mild  diffuse disc bulge noted.    L2-L3:    There is disc degeneration with diffuse disc bulge and facet  arthropathy causing mild canal narrowing, and mild bilateral foraminal  stenosis.    L3-L4:    There is disc degeneration with diffuse disc bulge and facet  arthropathy causing moderate canal narrowing eccentric to the right, mild  left and moderate foraminal stenosis.    L4-L5:    There is disc degeneration with diffuse disc bulge and facet  arthropathy causing mild to moderate canal narrowing eccentric to the  left and severe bilateral foraminal stenosis.    L5-S1:    Canal and foramina are patent    Sacrum and  iliac wings:   The visualized sacrum and iliac wings are  within normal limits.  Exam End: --    Specimen Collected: 08/14/24 12:27 Last Resulted: 08/14/24 12:35       Phani Youngblood Jr., M.D., FAAN

## 2024-11-14 DIAGNOSIS — F51.01 PRIMARY INSOMNIA: ICD-10-CM

## 2024-11-14 RX ORDER — TRAZODONE HYDROCHLORIDE 50 MG/1
100 TABLET ORAL NIGHTLY
Qty: 60 TABLET | Refills: 5 | Status: SHIPPED | OUTPATIENT
Start: 2024-11-14 | End: 2025-05-13

## 2024-11-18 DIAGNOSIS — G35 MULTIPLE SCLEROSIS (MULTI): ICD-10-CM

## 2024-11-21 RX ORDER — DIMETHYL FUMARATE 240 MG/1
CAPSULE ORAL 2 TIMES DAILY
Qty: 60 CAPSULE | Refills: 11 | Status: SHIPPED | OUTPATIENT
Start: 2024-11-21

## 2024-11-21 NOTE — PROGRESS NOTES
"Subjective   Patient ID: Genny Grant is a 59 y.o. female who presents for No chief complaint on file..    HPI  Consult referred by Neurology   For \"Arthritis\"    However I saw patient almost 2 years ago 3- and no follow up scheduled then  My notes: from 2023 consult visit     No results 2 years go  So I came back  In pain can not walk some days  More pain  Hip buttock lower back most on L >>>R side . Different now . I need to stop, stands and then can go again . Can not walk more than 5 minutes Can get relief from sitting then can to again.       RESULT:  mri sacrum pelvis aug 2014 [facility]     Bone Marrow: No fractures or suspicious marrow replacing lesions.     Hip joints: Large field-of-view images of the hips demonstrate mild   degenerative changes..  Evaluation of articular cartilage and labrum is   limited.     Sacroiliac joints: Within normal limits.     Pubic symphysis: Within normal limits.     Tendons: The iliopsoas, hamstring, gluteal, and rectus femoris tendons   are intact.     Muscles: Within normal limits.     MR lumbar spine w IV contrast [facility] 2024  Order: [id number]  Impression    RESULT:       Counting reference:  Lumbosacral junction.  For the purposes of this   report,  L4-5 is considered the level of the iliac crest and there are 5   lumbar-type vertebrae.  Anatomic variant: Transitional L5 vertebral body.     Localizer images:  No significant findings.     Alignment:    Alignment is anatomic.     Bone marrow signal/fracture: Small amount of edema in the left L4 and L5   pedicles, likely mechanical stress.  No evidence of pathologic marrow   infiltration.  No evidence of prior fracture.     Conus:  The conus is within normal limits of signal intensity and   morphology.  No abnormal leptomeningeal enhancement.     Paraspinal soft tissues:   Paraspinal soft tissues are within normal   limits.     T10-T11, T11-T12, T12-L1:  Visualized lower thoracic canal and foramina   are patent.  Mild " diffuse disc bulges noted.     L1-L2:    Canal and foramina are patent.  Disc degeneration with mild   diffuse disc bulge noted.     L2-L3:    There is disc degeneration with diffuse disc bulge and facet   arthropathy causing mild canal narrowing, and mild bilateral foraminal   stenosis.     L3-L4:   There is disc degeneration with diffuse disc bulge and facet   arthropathy causing moderate canal narrowing eccentric to the right, mild   left and moderate foraminal stenosis.     L4-L5:    There is disc degeneration with diffuse disc bulge and facet   arthropathy causing mild to moderate canal narrowing eccentric to the   left and severe bilateral foraminal stenosis.     L5-S1:    Canal and foramina are patent     Sacrum and iliac wings:   The visualized sacrum and iliac wings are   within normal limits.     IMPRESSION:    Lumbar spondylosis as described worst at L3-L4 with moderate canal  narrowing.  Small amount of edema in the left L4 and L5 pedicles, likely  mechanical stress.    Anatomic Lumbar Variant: Transitional L5 vertebral body.  L4-5 is  considered the level of the iliac crest and there are 5 lumbar-type  vertebrae.   If surgery is to be considered, correlation for level with  plain radiographs recommended.    IMPRESSION: Intact left ankle and left foot.  Pes planus, with mild   osteoarthritis.  Hallux valgus deformity.  No bony erosions.     Postoperative changes are seen about the ankle, with intact surgical   hardware.  Pes planus, with associated mild osteoarthritis.  Hallux   valgus deformity.  No acute findings.     ROS      Current Outpatient Medications   Medication Sig Dispense Refill    aspirin 81 mg EC tablet Take 1 tablet (81 mg) by mouth.      atorvastatin (Lipitor) 10 mg tablet Take 1 tablet (10 mg) by mouth.      baclofen (Lioresal) 20 mg tablet TAKE 1 TABLET BY MOUTH THREE TIMES DAILY 90 tablet 11    betamethasone dipropionate (Diprosone) 0.05 % lotion Apply topically 2 times a day. To  itchy areas of scalp for 2 months then decrease to MWF for maintenance 60 mL 3    bethanechol (Urecholine) 50 mg tablet Take 1 tablet (50 mg) by mouth.      bimatoprost (Latisse) 0.03 % ophthalmic solution Apply daily to areas of decreased hair on eyebrows 5 mL 3    capsicum (Zostrix) 0.075 % topical cream Apply 1 Application topically. PER DIRECTED      chlorthalidone (Hygroton) 25 mg tablet TAKE 1 TABLET BY MOUTH EVERY DAY 90 tablet 3    ciclopirox (Penlac) 8 % solution APPLY TO AFFECTED AREA DAILY AT BEDTIME. REMOVE FILM FROM MEDICATION ONCE WEEKLY WITH ALCOHOL SWAB      clindamycin (Cleocin T) 1 % lotion Apply 1 Application topically 2 times a day. apply 1 application twice a day topically to red bumps on skin      cycloSPORINE (Restasis MultiDose) 0.05 % drops Administer 1 drop into both eyes 2 times a day.      diclofenac (Voltaren) 0.1 % ophthalmic solution INSTILL 1 DROP IN EACH EYE ONCE DAILY      DULoxetine (Cymbalta) 60 mg DR capsule Take 1 capsule (60 mg) by mouth.      ergocalciferol (Vitamin D-2) 1.25 MG (63673 UT) capsule Take 1 capsule (1,250 mcg) by mouth 1 (one) time per week.      fluticasone (Flonase) 50 mcg/actuation nasal spray ADMINISTER 2 SPRAYS INTO EACH NOSTRIL ONCE DAILY. 48 mL 1    furosemide (Lasix) 40 mg tablet Take 1 tablet (40 mg) by mouth once daily.      gabapentin (Neurontin) 100 mg capsule Take 1 capsule (100 mg) by mouth 3 times a day.      heparin flush (heparin LockFlush,Porcine,,PF,) 10 unit/mL injection Infuse 5 mL (50 Units) into a venous catheter once daily.      hydrocortisone 2.5 % cream Apply topically 2 times a day. To dry patches on face for up to 2 weeks at a time. Do not use more than 14 days per month. 30 g 3    hydrOXYzine HCL (Atarax) 25 mg tablet Take by mouth.      ibandronate (Boniva) 150 mg tablet Take 1 tablet (150 mg) by mouth every 30 (thirty) days. Take in morning with full glass of water on an empty stomach. No food, drink, meds, or lying down for 60  minutes after.      ibuprofen 600 mg tablet Take by mouth.      ketoconazole (NIZOral) 2 % shampoo Apply topically 1 (one) time per week. Massage into scalp and let sit 5-10 minutes before rinsing 120 mL 11    lidocaine (Lidoderm) 5 % patch APPLY 1 PATCH TO INTACT SKIN REMOVE AFTER 12 HOURS EXTERNALLY ONCE A DAY FOR 30 DAYS      loratadine (Claritin) 10 mg tablet Take 1 tablet (10 mg) by mouth once daily.      multivitamin tablet Take 1 tablet by mouth once daily.      pantoprazole (ProtoNix) 40 mg EC tablet Take 1 tablet (40 mg) by mouth.      potassium chloride CR (Klor-Con M20) 20 mEq ER tablet Take 1 tablet (20 mEq) by mouth once daily.      propranolol XL (Innopran XL) 80 mg 24 hr capsule Take 1 capsule (80 mg) by mouth once daily.      Saline NasaL 0.65 % nasal spray PLACE 2 DROPS IN BOTH NOSTRILS EVERY 2 HOURS AS NEEDED      simethicone (Mylicon) 125 mg chewable tablet Chew 1 tablet (125 mg) every 6 hours if needed.      sodium chloride 0.9% (sodium chloride) flush Infuse 10 mL into a venous catheter once daily.      sucralfate (Carafate) 1 gram tablet TAKE 1 TABLET BY MOUTH TWICE A DAY ON AN EMPTY STOMACH      Tecfidera 240 mg capsule,delayed release(DR/EC) capsule TAKE 1 CAPSULE BY MOUTH 2 TIMES A DAY 60 capsule 11    tiZANidine (Zanaflex) 4 mg tablet Take 1 tablet (4 mg) by mouth 3 times a day. TAKE 1 TABLET BY MOUTH 3 TIMES DAILY AS NEEDED FOR BACK AND LEG CRAMPS      traMADol (Ultram) 50 mg tablet Take 1 tablet (50 mg) by mouth every 8 hours if needed.      traZODone (Desyrel) 50 mg tablet Take 2 tablets (100 mg) by mouth once daily at bedtime. 60 tablet 5    valsartan (Diovan) 40 mg tablet Take 1 tablet (40 mg) by mouth 2 times a day.       No current facility-administered medications for this visit.         has a past medical history of Hypersomnia, unspecified (09/10/2019), Multiple sclerosis (Multi), Personal history of other endocrine, nutritional and metabolic disease, and Personal history of other  specified conditions (01/04/2018).   No past surgical history on file.   Social History     Tobacco Use    Smoking status: Never    Smokeless tobacco: Never   Substance Use Topics    Alcohol use: Never    Drug use: Never      family history includes Alzheimer's disease in her mother; Malignant neoplasm in her father and mother; Sleep apnea in her mother.  Pain Management Panel  More data exists         Latest Ref Rng & Units 7/2/2020 11/26/2019   Pain Management Panel   Amphetamine Screen, Urine NEGATIVE PRESUMPTIVE NEGATIVE  PRESUMPTIVE NEGATIVE    Barbiturate Screen, Urine NEGATIVE PRESUMPTIVE NEGATIVE  PRESUMPTIVE NEGATIVE    Methadone Screen, Urine NEGATIVE PRESUMPTIVE NEGATIVE  PRESUMPTIVE NEGATIVE       Details                    There were no vitals filed for this visit.  Lab Results   Component Value Date    RF <10 12/23/2019    SEDRATE 7 12/23/2019    CRP 0.81 12/23/2019    TSH 1.77 06/22/2022       PHYSICAL EXAM      NODES   HEART  LUNGS  ABDOMEN   VASCULAR  NEURO   SKIN  JOINTS using cane to ambulate. Pain with min lom both hips  Neuro exam is normal UE is normal     PLAN  There are no diagnoses linked to this encounter.  I saw Ms. Grant 2 years ago at which her diagnosis was fibromyalgia and osteoarthritis.  She was also diagnosed with multiple sclerosis for which she continues to see Dr. Youngblood in neurology.    She returns now with multifactorial problems because of her chronic pain mostly in her lower back left leg and hip    I believe that the most pressing problem is her degenerative spine including moderate lumbar canal stenosis at L3-4 noted on an MRI at [facility] this year.    She also has some mild hip arthritis and has some pain with almost normal range of motion in the groin bilaterally    At this time I would suggest she consider a quarter caudal epidural steroid injection or series of series of such and I have referred her to pain management for that consideration    She has tried  gabapentin tramadol and other medications for her pain in the past and none of these seem to work possibly because some of her pain is fibromyalgia induced.    I told her that she should certainly let me know how her pain management consultation and epidural injections have worked.  She needs to see physical therapy after that if she has the time and motivation    It might be that she also might need some injections in the hips which should be done under visualization in radiology.

## 2024-11-22 ENCOUNTER — APPOINTMENT (OUTPATIENT)
Dept: RHEUMATOLOGY | Facility: CLINIC | Age: 59
End: 2024-11-22
Payer: MEDICARE

## 2024-11-22 VITALS
WEIGHT: 155 LBS | SYSTOLIC BLOOD PRESSURE: 109 MMHG | DIASTOLIC BLOOD PRESSURE: 73 MMHG | HEART RATE: 90 BPM | OXYGEN SATURATION: 96 % | BODY MASS INDEX: 25.83 KG/M2 | HEIGHT: 65 IN

## 2024-11-22 DIAGNOSIS — M54.50 CHRONIC LOW BACK PAIN, UNSPECIFIED BACK PAIN LATERALITY, UNSPECIFIED WHETHER SCIATICA PRESENT: ICD-10-CM

## 2024-11-22 DIAGNOSIS — G35 MULTIPLE SCLEROSIS (MULTI): Primary | ICD-10-CM

## 2024-11-22 DIAGNOSIS — G89.29 CHRONIC PAIN OF BOTH LOWER EXTREMITIES: Chronic | ICD-10-CM

## 2024-11-22 DIAGNOSIS — G89.29 CHRONIC LOW BACK PAIN, UNSPECIFIED BACK PAIN LATERALITY, UNSPECIFIED WHETHER SCIATICA PRESENT: ICD-10-CM

## 2024-11-22 DIAGNOSIS — M79.605 CHRONIC PAIN OF BOTH LOWER EXTREMITIES: Chronic | ICD-10-CM

## 2024-11-22 DIAGNOSIS — M48.062 SPINAL STENOSIS OF LUMBAR REGION WITH NEUROGENIC CLAUDICATION: ICD-10-CM

## 2024-11-22 DIAGNOSIS — M79.7 FIBROMYALGIA: ICD-10-CM

## 2024-11-22 DIAGNOSIS — S39.012D STRAIN OF LUMBAR REGION, SUBSEQUENT ENCOUNTER: ICD-10-CM

## 2024-11-22 DIAGNOSIS — M16.0 PRIMARY OSTEOARTHRITIS OF BOTH HIPS: ICD-10-CM

## 2024-11-22 DIAGNOSIS — M19.90 ARTHRITIS: ICD-10-CM

## 2024-11-22 DIAGNOSIS — M79.604 CHRONIC PAIN OF BOTH LOWER EXTREMITIES: Chronic | ICD-10-CM

## 2024-11-22 PROCEDURE — 3078F DIAST BP <80 MM HG: CPT | Performed by: INTERNAL MEDICINE

## 2024-11-22 PROCEDURE — 99215 OFFICE O/P EST HI 40 MIN: CPT | Performed by: INTERNAL MEDICINE

## 2024-11-22 PROCEDURE — 3008F BODY MASS INDEX DOCD: CPT | Performed by: INTERNAL MEDICINE

## 2024-11-22 PROCEDURE — 3074F SYST BP LT 130 MM HG: CPT | Performed by: INTERNAL MEDICINE

## 2024-11-22 ASSESSMENT — PAIN SCALES - GENERAL: PAINLEVEL_OUTOF10: 8

## 2024-11-25 DIAGNOSIS — I10 BENIGN ESSENTIAL HTN: ICD-10-CM

## 2024-11-26 RX ORDER — CHLORTHALIDONE 25 MG/1
25 TABLET ORAL DAILY
Qty: 90 TABLET | Refills: 3 | Status: SHIPPED | OUTPATIENT
Start: 2024-11-26 | End: 2025-11-26

## 2025-01-06 DIAGNOSIS — L20.89 FLEXURAL ATOPIC DERMATITIS: ICD-10-CM

## 2025-01-07 RX ORDER — HYDROCORTISONE 25 MG/G
CREAM TOPICAL
Qty: 30 G | Refills: 3 | Status: SHIPPED | OUTPATIENT
Start: 2025-01-07

## 2025-02-04 DIAGNOSIS — L66.10 LICHEN PLANOPILARIS: ICD-10-CM

## 2025-02-05 RX ORDER — BETAMETHASONE DIPROPIONATE 0.5 MG/G
LOTION TOPICAL
Qty: 60 ML | Refills: 10 | Status: SHIPPED | OUTPATIENT
Start: 2025-02-05

## 2025-02-10 ENCOUNTER — OFFICE VISIT (OUTPATIENT)
Dept: DERMATOLOGY | Facility: HOSPITAL | Age: 60
End: 2025-02-10
Payer: MEDICARE

## 2025-02-10 DIAGNOSIS — L65.9 ALOPECIA: ICD-10-CM

## 2025-02-10 DIAGNOSIS — L66.10 LICHEN PLANOPILARIS: ICD-10-CM

## 2025-02-10 DIAGNOSIS — L20.89 FLEXURAL ATOPIC DERMATITIS: ICD-10-CM

## 2025-02-10 DIAGNOSIS — L21.9 SEBORRHEIC DERMATITIS: Primary | ICD-10-CM

## 2025-02-10 PROCEDURE — 99214 OFFICE O/P EST MOD 30 MIN: CPT | Performed by: DERMATOLOGY

## 2025-02-10 PROCEDURE — 1036F TOBACCO NON-USER: CPT | Performed by: DERMATOLOGY

## 2025-02-10 RX ORDER — CLOBETASOL PROPIONATE 0.5 MG/G
OINTMENT TOPICAL
Qty: 30 G | Refills: 3 | Status: SHIPPED | OUTPATIENT
Start: 2025-02-10

## 2025-02-10 RX ORDER — BIMATOPROST 3 UG/ML
SOLUTION TOPICAL
Qty: 5 ML | Refills: 3 | Status: SHIPPED | OUTPATIENT
Start: 2025-02-10

## 2025-02-10 ASSESSMENT — DERMATOLOGY PATIENT ASSESSMENT
DO YOU HAVE ANY NEW OR CHANGING LESIONS: NO
HAVE YOU HAD OR DO YOU HAVE VASCULAR DISEASE: NO
HAVE YOU HAD OR DO YOU HAVE A STAPH INFECTION: NO
ARE YOU AN ORGAN TRANSPLANT RECIPIENT: NO
DO YOU USE A TANNING BED: NO
ARE YOU TRYING TO GET PREGNANT: NO
ARE YOU ON BIRTH CONTROL: NO
DO YOU HAVE IRREGULAR MENSTRUAL CYCLES: NO

## 2025-02-10 ASSESSMENT — DERMATOLOGY QUALITY OF LIFE (QOL) ASSESSMENT
RATE HOW BOTHERED YOU ARE BY EFFECTS OF YOUR SKIN PROBLEMS ON YOUR ACTIVITIES (EG, GOING OUT, ACCOMPLISHING WHAT YOU WANT, WORK ACTIVITIES OR YOUR RELATIONSHIPS WITH OTHERS): 0 - NEVER BOTHERED
RATE HOW EMOTIONALLY BOTHERED YOU ARE BY YOUR SKIN PROBLEM (FOR EXAMPLE, WORRY, EMBARRASSMENT, FRUSTRATION): 0 - NEVER BOTHERED
RATE HOW BOTHERED YOU ARE BY SYMPTOMS OF YOUR SKIN PROBLEM (EG, ITCHING, STINGING BURNING, HURTING OR SKIN IRRITATION): 0 - NEVER BOTHERED
ARE THERE EXCLUSIONS OR EXCEPTIONS FOR THE QUALITY OF LIFE ASSESSMENT: NO
DATE THE QUALITY-OF-LIFE ASSESSMENT WAS COMPLETED: 67246

## 2025-02-10 ASSESSMENT — ITCH NUMERIC RATING SCALE: HOW SEVERE IS YOUR ITCHING?: 7

## 2025-02-10 ASSESSMENT — PATIENT GLOBAL ASSESSMENT (PGA): PATIENT GLOBAL ASSESSMENT: PATIENT GLOBAL ASSESSMENT:  1 - CLEAR

## 2025-02-10 NOTE — PROGRESS NOTES
Subjective     Genny Grant is a 59 y.o. female who presents for the following: Dermatitis (scalp). Last derm visit 10/14/24 for lichen planopilaris and atopic dermatitis. History of post-herpetic neuralgia in trigeminal distribution as well.     She has MS and degenerative spine    Her scalp has been bothering her again since last visit. Using betamethasone, sometimes burns    Review of Systems:  No other skin or systemic complaints other than what is documented elsewhere in the note.    The following portions of the chart were reviewed this encounter and updated as appropriate:   Tobacco  Allergies  Meds  Problems  Med Hx  Surg Hx         Skin Cancer History  No skin cancer on file.      Specialty Problems          Dermatology Problems    Other seborrheic dermatitis    Telogen effluvium    Contusion of multiple sites        Objective   Well appearing patient in no apparent distress; mood and affect are within normal limits.    A focused skin examination was performed. All findings within normal limits unless otherwise noted below.    Assessment/Plan   1. Seborrheic dermatitis  Scalp  Light pink erythema with scale on occipital and frontal scalp is now resolved     - seasonal flares can happen  - continue with betamethasone 3x per week for maintenance    Related Procedures  Follow Up In Dermatology - Established Patient    2. Lichen planopilaris    Related Procedures  Follow Up In Dermatology - Established Patient    Related Medications  betamethasone dipropionate (Diprosone) 0.05 % lotion  APPLY TOPICALLY TWICE DAILY TO ITCHY AREAS OF SCALP FOR 2 MONTHS THEN DECREASE TO MONDAY, WEDNESDAY, FRIDAY FOR MAINTENANCE    3. Alopecia  Scalp  Follicular prominence previously on crown of scalp is now resolving, still with some loss of frontal hairline as compared to photo 10/2023 with follicular preservation. No vellus hairs                            - overlap of female pattern, traction, and possibly a  scarring alopecia like lichen planopilaris  - she has declined scalp biopsy    - 10/14/24 scalp was flaring, restarted betamethasone with good result today  - continue betamethasone 3x per week for maintenance for at least 1 year  - re-take photos today  - if worsening, consider scalp biopsy    - for eyebrows, latisse    Related Procedures  Follow Up In Dermatology - Established Patient    Related Medications  bimatoprost (Latisse) 0.03 % ophthalmic solution  Apply daily to areas of decreased hair on eyebrows    4. Flexural atopic dermatitis  Head - Anterior (Face)  Resolved eczematous patches on face. Currently with thin patches on thumb with some cracking    Body surface area:  1%       - resolved on face, stop hydrocortisone  - add clobetasol ointment for hands      clobetasol (Temovate) 0.05 % ointment - Head - Anterior (Face)  Apply to affected areas of eczema on hands twice daily when active as needed. Use less than 14 days per month.    Related Procedures  Follow Up In Dermatology - Established Patient    Related Medications  hydrocortisone 2.5 % cream  APPLY TOPICALLY TWICE DAILY TO DRY PATCHES ON FACE FOR UP TO 2 WEEKS AT A TIME. DO NOT USE MORE THAN 14 DAYS PER MONTH        Follow up 4-6 months hair loss and eczema

## 2025-02-12 DIAGNOSIS — L65.8 FEMALE PATTERN ALOPECIA: ICD-10-CM

## 2025-02-12 RX ORDER — KETOCONAZOLE 20 MG/ML
SHAMPOO, SUSPENSION TOPICAL
Qty: 120 ML | Refills: 10 | Status: SHIPPED | OUTPATIENT
Start: 2025-02-12

## 2025-03-06 ENCOUNTER — APPOINTMENT (OUTPATIENT)
Dept: PAIN MEDICINE | Facility: CLINIC | Age: 60
End: 2025-03-06
Payer: MEDICARE

## 2025-03-06 VITALS
HEART RATE: 62 BPM | BODY MASS INDEX: 25.29 KG/M2 | SYSTOLIC BLOOD PRESSURE: 109 MMHG | WEIGHT: 152 LBS | DIASTOLIC BLOOD PRESSURE: 64 MMHG | OXYGEN SATURATION: 95 %

## 2025-03-06 DIAGNOSIS — M51.362 DEGENERATION OF INTERVERTEBRAL DISC OF LUMBAR REGION WITH DISCOGENIC BACK PAIN AND LOWER EXTREMITY PAIN: ICD-10-CM

## 2025-03-06 DIAGNOSIS — M54.16 LUMBAR RADICULOPATHY, ACUTE: ICD-10-CM

## 2025-03-06 DIAGNOSIS — M48.062 SPINAL STENOSIS OF LUMBAR REGION WITH NEUROGENIC CLAUDICATION: Primary | ICD-10-CM

## 2025-03-06 DIAGNOSIS — G35 MULTIPLE SCLEROSIS (MULTI): ICD-10-CM

## 2025-03-06 PROCEDURE — 3074F SYST BP LT 130 MM HG: CPT | Performed by: PHYSICAL MEDICINE & REHABILITATION

## 2025-03-06 PROCEDURE — 99204 OFFICE O/P NEW MOD 45 MIN: CPT | Performed by: PHYSICAL MEDICINE & REHABILITATION

## 2025-03-06 PROCEDURE — 3078F DIAST BP <80 MM HG: CPT | Performed by: PHYSICAL MEDICINE & REHABILITATION

## 2025-03-06 NOTE — PROGRESS NOTES
Chief complaint  Back and lower limbs pain      Harry COFFMAN VONDAN,   was present during the entire history and physical examination    History  Genny Grant is referred for pain management evaluation and treatment referred by Dr. Simpson   She has history of MS and pain and weakness but currently presenting for pain in the back and legs compatible with pain in the  back and lower limbs.  The pain in the lower back is deep achy stabbing across the back area.  It does radiate to the lateral and posterior aspect of the lower limbs sometimes reaching the ankles and feet.  The pain goes and up and down she have episodes lasting several weeks to few days this recent episode has been affecting her for several weeks.  She has not had any physical therapy lately but she has been doing home exercise program as much as she can tolerate.  She is limited with her multiple sclerosis given her weakness  She is able to walk at home but using motorized wheelchair for community distances.  Her difficulty with ambulation is also affecting her ability to do her home exercise program  In addition her home exercise program is limited by   fatigue and pain  The pain is interfering with activities of daily living, quality of life and quality of sleep. It is limiting the functions and everything takes longer to complete because of the slowing related to the pain. Movements are cautious to avoid aggravation of the symptoms.   She has MRI from August 2024 I went over the results of that and discussed with her about the findings which could explain her back pain and lower limb pain.    At rest the pain level is Between 3 and 5/10 with aggravation Pain level  at 6 to 8/10.    She is limited with her function and activities of daily living ambulation because of the back and legs pain but also because of her multiple sclerosis      Review of Systems   Denied any fever or chills. No weight loss and no night sweats. No cough or sputum  production. No diarrhea        No bladder and bowel incontinence and no other changes in bladder and bowel. No skin changes.  Reports tiredness and fatigability only if the pain is not controlled.   Denied opioids diversion and abuse and denies alcoholism. Denies overuse of the pain medications.        Physical examination  Awake, alert and oriented for time place and persons   Pupils are equal and reactive to light and accommodation    Today she presented on motorized wheelchair she is very proficient in using it.  Was able to make few steps..  She had difficulty transitioning from sitting to standing a small fashion.  Observation of the lumbar spine showed mild reversal of lumbar lordosis with paraspinal atrophy straight leg raising increase the pain in the back down the lateral aspect of the legs  Limited range of motion of the lumbar spine in 20 degrees of flexion extension was to 10 degrees negative pain to percussion of the spinous processes  She has decreased sensation to light touch on the lateral aspect of the legs on both sides big toe extension are 4/5 bilaterally deep tendon reflexes are depressed for the knees and ankle plantar cutaneous are achy focal skin is intact in the lower limb no skin breakdown capillary refill is 2 seconds.  No changes in color or texture of the skin    Diagnosis  Problem List Items Addressed This Visit       Multiple sclerosis (Multi)    Relevant Orders    Referral to Physical Therapy    Lumbar radiculopathy, acute    Relevant Orders    Referral to Physical Therapy    Spinal stenosis of lumbar region with neurogenic claudication - Primary    Relevant Orders    Referral to Physical Therapy     Other Visit Diagnoses       Degeneration of intervertebral disc of lumbar region with discogenic back pain and lower extremity pain        Relevant Orders    Referral to Physical Therapy             Plan  Reviewed the pain generators.  Went over the types of pain with neuropathic and  nociceptive and different pathologies and therapeutic modalities. Discussed the mechanism of action of interventions from acupuncture, physical therapy , regular exercises, injections, botox, spinal cord stimulation, and role of surgery     Went over pathology of the intervertebral disc displacement and the anatomical relation to the Nerve roots and relation to the radicular symptoms. Went over treatment modalities with conservative treatment including acupuncture   and epidural steroid injection with fluoroscopy guidance and last resort of surgery     I discussed with her that her clinical picture could be also confounded by the presence of multiple sclerosis which give numbness and weakness however her symptoms in the lower limbs are compatible with radicular distribution of lower lumbar area.  Again we went  over MRI from 8/2024  I discussed with her about natural history of radiculitis and prognosis.  A lot of patients would improve spontaneously at least in the initial phases where she is now or with physical therapy if that fails we will consider injection and potentially proceed with surgery if the symptoms continue to get worse  She is on ibuprofen that is helping as needed  At this time we will refer to PT if not improved consider JAMESON at that time    Discussed about NSAIDS and I explained about the opioids sparing effect to allow keeping the opioids dose at minimal effective dose.   I went over the potential side effects of the NSAIDS on the gastrointestinal, renal and cardiovascular systems.      I detailed the side effects from the acetaminophen in the medication and made aware of those. I also explained about the cumulative effects on the organs and mainly the liver.       Follow-up after above or earlier if needed     The level of clinical decision making in this office visit,  is high, given the serious and fluctuating nature of pain level and instensity with extensive consideration for whenever pain  changes, there is always the risk of prolonged functional impairment requiring close patient monitoring with regular assessments and reassessments and high level medical decision making at every office visit. The amount and complexity of data reviewed is high given the patient clinical presentation, labs,  data, radiology reports, and other tests as discussed during office visits. Pertinent data whether positive or negative were taken in consideration in the process of making this high level medical decision.

## 2025-04-01 ENCOUNTER — EVALUATION (OUTPATIENT)
Dept: PHYSICAL THERAPY | Facility: CLINIC | Age: 60
End: 2025-04-01
Payer: MEDICARE

## 2025-04-01 DIAGNOSIS — G35 MULTIPLE SCLEROSIS (MULTI): ICD-10-CM

## 2025-04-01 DIAGNOSIS — M48.062 SPINAL STENOSIS OF LUMBAR REGION WITH NEUROGENIC CLAUDICATION: ICD-10-CM

## 2025-04-01 DIAGNOSIS — M51.362 DEGENERATION OF INTERVERTEBRAL DISC OF LUMBAR REGION WITH DISCOGENIC BACK PAIN AND LOWER EXTREMITY PAIN: ICD-10-CM

## 2025-04-01 DIAGNOSIS — M54.16 LUMBAR RADICULOPATHY, ACUTE: ICD-10-CM

## 2025-04-01 PROCEDURE — 97110 THERAPEUTIC EXERCISES: CPT | Mod: GP | Performed by: PHYSICAL THERAPIST

## 2025-04-01 ASSESSMENT — ENCOUNTER SYMPTOMS
LOSS OF SENSATION IN FEET: 1
OCCASIONAL FEELINGS OF UNSTEADINESS: 1
DEPRESSION: 0

## 2025-04-01 NOTE — PROGRESS NOTES
Physical Therapy  Physical Therapy Orthopedic Evaluation    Patient Name: Genny Grant  MRN: [id number]  Today's Date: 4/1/2025  Time Calculation  Start Time: 1050  Stop Time: 1132  Time Calculation (min): 42 min    PT Evaluation Time Entry  PT Evaluation (Complex) Time Entry: 15  PT Therapeutic Procedures Time Entry  Therapeutic Exercise Time Entry: 15       Insurance:  Visit number: 1  Authorization info: auth req  Insurance Type: Payor: AN[employer] MED[employer]ARE / Plan: AN[employer] DUAL ADVANTAGE / Product Type: *No Product type* /      Current Problem     Problem List Items Addressed This Visit             ICD-10-CM    Multiple sclerosis (Multi) G35    Relevant Orders    Follow Up In Physical Therapy    Lumbar radiculopathy, acute M54.16    Relevant Orders    Follow Up In Physical Therapy    Spinal stenosis of lumbar region with neurogenic claudication M48.062    Relevant Orders    Follow Up In Physical Therapy    Degeneration of intervertebral disc of lumbar region with discogenic back pain and lower extremity pain M51.362    Relevant Orders    Follow Up In Physical Therapy       Surgery:    Referred by:   Homar Wheeler MD     Precautions:   MS, fibromyalgia, osteoporosis, RA, former CA, HBP, High cholesterol, heart palpitations, DWIGHT, neuropathy    Subjective:   Subjective   Chief Complaint: Low back and leg pain  Onset: chronic   VARSHA: gradual worsening    General:     Current Condition:   Same. back and upper neck pain. Also notices radiating pain down her, standing causes her more pain    Pain:     Location: Low back ad legs   Rating: Best-5, Current-5, Worst-10  Description: aggravating consistent pain, pain in legs can be short L>R at times   Aggravating Factors:  standing and walking for longer periods of time  Relieving Factors:  ibuprofen,tylenol, stretching, lidocaine patches     Relevant Information (PMH & Previous Tests/Imaging): MRI 2024 Lumbar spondylosis as described worst at L3-L4 with moderate  canal  narrowing.  Small amount of edema in the left L4 and L5 pedicles, likely mechanical stress.     Previous Interventions/Treatments: None    Prior Level of Function (PLOF)  Patient previously independent with all ADLs  Exercise/Physical Activity: walking and dancing  Work/School: Retired teacher     Patients Living Environment: Reviewed and no concern  Primary Language: English  Patient's Goal(s) for Therapy: improve walking better with longer distance    Red Flags: Do you have any of the following? No  Fever/chills, unexplained weight changes, dizziness/fainting, unexplained change in bowel or bladder functions, unexplained malaise or muscle weakness, night pain/sweats, numbness or tingling    Objective:  Objective     Palpation/Observation  TTP through lower R >L paraspinals  Posture  Presents in motor chair, no accentuated curvatures, rigid standing posture with single point cane   Special Testing  + SLR, asis compression and gap testing, sacral rock  ROM  4/1/2025  Trunk AROM  Flexion 25%  Ext 25%  L rot 25-50%  R rot 25%   All are painful  HS pop angle 40deg abdifatah  Hip flexion 100deg abdifatah  Abdifatah hip rotation IR and ER limited by 10deg, log roll limited by 10 deg abdifatah  No restrictions with hip ABD/ADD  Central PA glides show restriction withLower lumbar mobility and pain is present    Strength  4/1/2025  Abdifatah Knee ext and flex 3/5 unable to tolerate resistance, abdifatah hip flex 3+/5, she cannot perform standing heel or calf raise, 3/5 seated heel and calf raise  Sensation  L>R radicular pain  Reflexes  1+ achilles patellar DTR, neg clonus    Transfers: slow, abdifatah UE assist, cane assist, mild guarding  Gait: uses motor chair, reports she ambulates at home with Single point cane for short distances  SL Balance:   DL Squat: sit to stand antalgic with UE assist, and decreased Lower extremity push, more of a pull/push with arms to stand  SL Squat:      Outcome Measures:  Other Measures  Oswestry Disablity Index (JAISON): 21    4/1/2025  Treatments:  Ther-EX:  - Lower Trunk Rotations  - 1 x daily - 2 sets - 10 reps - 2 hold  - Supine Posterior Pelvic Tilt  - 1 x daily - 3 sets - 5 reps - 3 hold  - Hooklying Gluteal Sets  - 1 x daily - 1 sets - 15 reps - 3-5 hold  - Supine March  - 1 x daily - 1 sets - 10 reps  Manual:    Modalities:  Discussed ice and heat    PT Evaluation Time Entry  PT Evaluation (Complex) Time Entry: 15  PT Therapeutic Procedures Time Entry  Therapeutic Exercise Time Entry: 15       EDUCATION: Home exercise program, plan of care, activity modifications, pain management, and injury pathology     Code: [id number]     Medical History Form: Reviewed (scanned into chart)  Reviewed medical form, [place], [place] risk, Buddhist beliefs, PHQ     Screening  Frequency  Date Last Completed   Spiritual and Cultural Beliefs   Screening each visit or episode of care 4/1/2025   Falls Risk Screening every ambulatory visit 4/1/2025 12:09 PM   Pain Screening annually at primary care visit  11/22/2024   Domestic Violence screening annually at primary care visit 7/17/2024   Depression Screening annually in the primary care setting 6/10/2024   Suicide Risk Screening annually in the primary care setting    Nutrition and Food Insecurity   Screening at least annually at primary care visit  6/10/2024   Key Learner annually in the primary care setting 4/1/2025   Drug Screen  6/10/2024 11:04 AM   Alcohol Screen  6/10/2024 11:04 AM   Advance Directive  7/17/2024     Time Calculation  Start Time: 1050  Stop Time: 1132  Time Calculation (min): 42 min  PT Evaluation Time Entry  PT Evaluation (Complex) Time Entry: 15 PT Therapeutic Procedures Time Entry  Therapeutic Exercise Time Entry: 15          Assessment: Patient presents with signs and symptoms consistent with Lumbar stenosis with DDD and Radicular pain, complex eval secondary to co morbidities, resulting in limited participation in pain-free ADLs and inability to perform at their prior level  of function. Pt would benefit from physical therapy to address the impairments found & listed previously in the objective section in order to return to safe and pain-free ADLs and prior level of function.     Complexity:high  Prognosis: fair  Response to care: fair  Clinical Presentation: Stable and/or uncomplicated characteristics  Personal Factors: Comorbidities    Problem List:  Pain  Function  Mobility  Strength  Plan:     Planned Interventions include: therapeutic exercise, self-care home management, manual therapy, therapeutic activities, gait training, neuromuscular coordination, vasopneumatic, dry needling, aquatic therapy    Next Treatment:progress ROM, Lower extremity and lumbar flexibility  Frequency: 1-2 x Week  Duration: 8 Weeks  Goals: Set and discussed today  4 weeks  Patient to have pain less than 5/10 for improved sit to stand ability  Patient to be independent with HEP and progression for improved carryover  Improved ability to perform walking 5 min with single point cane for improved mobility  Improved tolerance with rere knee ext to 3+/5 for improved weightbearing stability and strength    8 weeks  Patient to have improved JAISON score by 15 points for improved function at home  Patient to have pain less than 4/10 for improved ADL performance up to 20 min  Improved Walking 10 min for improved functional independence for better function with daily activities  Patient to perform sit to stand 5 x in 1 minutes for improved lower extremity strength and transfers for motor chair    Plan of care was developed with input and agreement by the patient      Richie Moreno, PT

## 2025-04-03 DIAGNOSIS — G35 MULTIPLE SCLEROSIS (MULTI): ICD-10-CM

## 2025-04-04 RX ORDER — BACLOFEN 20 MG/1
20 TABLET ORAL 3 TIMES DAILY
Qty: 90 TABLET | Refills: 10 | Status: SHIPPED | OUTPATIENT
Start: 2025-04-04

## 2025-04-21 ENCOUNTER — APPOINTMENT (OUTPATIENT)
Dept: NEUROLOGY | Facility: CLINIC | Age: 60
End: 2025-04-21
Payer: MEDICARE

## 2025-04-21 VITALS — HEART RATE: 62 BPM | SYSTOLIC BLOOD PRESSURE: 98 MMHG | DIASTOLIC BLOOD PRESSURE: 72 MMHG | TEMPERATURE: 96 F

## 2025-04-21 DIAGNOSIS — G35 MULTIPLE SCLEROSIS (MULTI): Primary | ICD-10-CM

## 2025-04-21 DIAGNOSIS — S16.1XXD STRAIN OF NECK MUSCLE, SUBSEQUENT ENCOUNTER: ICD-10-CM

## 2025-04-21 DIAGNOSIS — S39.012D STRAIN OF LUMBAR REGION, SUBSEQUENT ENCOUNTER: ICD-10-CM

## 2025-04-21 PROCEDURE — 99213 OFFICE O/P EST LOW 20 MIN: CPT | Performed by: PSYCHIATRY & NEUROLOGY

## 2025-04-21 PROCEDURE — 3074F SYST BP LT 130 MM HG: CPT | Performed by: PSYCHIATRY & NEUROLOGY

## 2025-04-21 PROCEDURE — 3078F DIAST BP <80 MM HG: CPT | Performed by: PSYCHIATRY & NEUROLOGY

## 2025-04-21 PROCEDURE — 1036F TOBACCO NON-USER: CPT | Performed by: PSYCHIATRY & NEUROLOGY

## 2025-04-21 RX ORDER — TIZANIDINE 4 MG/1
4 TABLET ORAL EVERY 8 HOURS PRN
Qty: 30 TABLET | Refills: 11 | Status: SHIPPED | OUTPATIENT
Start: 2025-04-21

## 2025-04-21 NOTE — PROGRESS NOTES
NEUROLOGY OUTPATIENT FOLLOW-UP NOTE    Assessment/Plan   Diagnoses and all orders for this visit:  Multiple sclerosis (Multi)  Strain of neck muscle, subsequent encounter  -     tiZANidine (Zanaflex) 4 mg tablet; Take 1 tablet (4 mg) by mouth every 8 hours if needed for muscle spasms.  Strain of lumbar region, subsequent encounter  -     tiZANidine (Zanaflex) 4 mg tablet; Take 1 tablet (4 mg) by mouth every 8 hours if needed for muscle spasms.      IMPRESSION:  Stable MS.  Cervical and lumbar strain, for which I refilled tizanidine as she is saying it is helpful.    PLAN:  To continue dimethyl fumarate.  I will see her again in six months or prn.      Phani Youngblood Jr., M.D., FAAN   ----------    Subjective     Genny Grant is a 60 y.o. year old female here for follow-up.    She denies new MS symptoms at this time.  She denies other focal neurological symptoms including dysarthria, dysphagia, diplopia, focal weakness, focal sensory change, ataxia, vertigo, or bowel/bladder incontinence, among others.  She does have some muscle tightness for which she is using old tizanidine.    She says she saw Rheum, then Pain Management, for the joint pain.  She presents in a motorized wheelchair today, which she is using because she has such discomfort in walking that she is not sure her legs will carry her.    Medical History[1]  Surgical History[2]  Social History     Tobacco Use    Smoking status: Never    Smokeless tobacco: Never   Substance Use Topics    Alcohol use: Never     family history includes Alzheimer's disease in her mother; Malignant neoplasm in her father and mother; Sleep apnea in her mother.  Current Medications[3]  Allergies[4]    Objective     BP 98/72   Pulse 62   Temp 35.6 °C (96 °F)     CONSTITUTIONAL:  No acute distress     EXTREMITIES:  Focal tenderness of the SI joints and hips bilaterally, much more so on the left.     SPINE:  tightness of the cervical and lumbar paraspinals.      MENTAL STATUS:  Awake, alert, fully oriented to self, place, and time, with present short-term memory, good awareness of recent events, normal attention span, concentration, and fund of knowledge.     SPEECH AND LANGUAGE:  Can name and repeat, follows all commands, has no dysarthria     CRANIAL NERVES:  II-Vision present, visual fields full to confrontational testing     III/IV/VI--EOMs are present in all directions.  Pupils are symmetrically reactive in dim light.  No ptosis.     V--Normal facial sensation.     VII--No facial asymmetry.     VIII--Hearing present to voice bilaterally.     IX/X--Symmetric soft palate rise.     XI--Normal trapezius power bilaterally.     XII--Tongue protrudes without deviation.     MOTOR:  Normal power, tone, and bulk in both arms and both legs.     SENSORY:  Reduced pin sensation in a stocking distribution from the feet to the mid-shins (stable).      COORDINATION:  Normal finger-to-nose and heel-to-shin testing in both arms and both legs.     REFLEXES are normal and symmetric at the biceps, triceps, brachioradialis, patella, and ankle.  The plantar responses are flexor.     GAIT is antalgic due to bilateral hip pain and low back pain.  She has no steppage, ataxia, shuffling, or spasticity.  She brings her motorized wheelchair today.      Phani Youngblood Jr., M.D., FAAN         [1]   Past Medical History:  Diagnosis Date    Hypersomnia, unspecified 09/10/2019    Hypersomnia    Multiple sclerosis (Multi)     History of multiple sclerosis    Personal history of other endocrine, nutritional and metabolic disease     History of high cholesterol    Personal history of other specified conditions 01/04/2018    History of insomnia   [2] No past surgical history on file.  [3]   Current Outpatient Medications:     aspirin 81 mg EC tablet, Take 1 tablet (81 mg) by mouth. (Patient not taking: Reported on 4/21/2025), Disp: , Rfl:     atorvastatin (Lipitor) 10 mg tablet, Take 1 tablet (10  mg) by mouth., Disp: , Rfl:     baclofen (Lioresal) 20 mg tablet, TAKE 1 TABLET BY MOUTH THREE TIMES DAILY, Disp: 90 tablet, Rfl: 10    betamethasone dipropionate (Diprosone) 0.05 % lotion, APPLY TOPICALLY TWICE DAILY TO ITCHY AREAS OF SCALP FOR 2 MONTHS THEN DECREASE TO MONDAY, WEDNESDAY, FRIDAY FOR MAINTENANCE, Disp: 60 mL, Rfl: 10    bethanechol (Urecholine) 50 mg tablet, Take 1 tablet (50 mg) by mouth., Disp: , Rfl:     bimatoprost (Latisse) 0.03 % ophthalmic solution, Apply daily to areas of decreased hair on eyebrows, Disp: 5 mL, Rfl: 3    capsicum (Zostrix) 0.075 % topical cream, Apply 1 Application topically. PER DIRECTED (Patient not taking: Reported on 4/21/2025), Disp: , Rfl:     chlorthalidone (Hygroton) 25 mg tablet, Take 1 tablet (25 mg) by mouth once daily., Disp: 90 tablet, Rfl: 3    ciclopirox (Penlac) 8 % solution, APPLY TO AFFECTED AREA DAILY AT BEDTIME. REMOVE FILM FROM MEDICATION ONCE WEEKLY WITH ALCOHOL SWAB (Patient not taking: Reported on 4/21/2025), Disp: , Rfl:     clindamycin (Cleocin T) 1 % lotion, Apply 1 Application topically 2 times a day. apply 1 application twice a day topically to red bumps on skin, Disp: , Rfl:     clobetasol (Temovate) 0.05 % ointment, Apply to affected areas of eczema on hands twice daily when active as needed. Use less than 14 days per month., Disp: 30 g, Rfl: 3    cycloSPORINE (Restasis MultiDose) 0.05 % drops, Administer 1 drop into both eyes 2 times a day., Disp: , Rfl:     diclofenac (Voltaren) 0.1 % ophthalmic solution, INSTILL 1 DROP IN EACH EYE ONCE DAILY, Disp: , Rfl:     DULoxetine (Cymbalta) 60 mg DR capsule, Take 1 capsule (60 mg) by mouth., Disp: , Rfl:     ergocalciferol (Vitamin D-2) 1.25 MG (45655 UT) capsule, Take 1 capsule (1,250 mcg) by mouth 1 (one) time per week., Disp: , Rfl:     fluticasone (Flonase) 50 mcg/actuation nasal spray, ADMINISTER 2 SPRAYS INTO EACH NOSTRIL ONCE DAILY., Disp: 48 mL, Rfl: 1    furosemide (Lasix) 40 mg tablet, Take  1 tablet (40 mg) by mouth once daily., Disp: , Rfl:     gabapentin (Neurontin) 100 mg capsule, Take 1 capsule (100 mg) by mouth 3 times a day., Disp: , Rfl:     heparin flush (heparin LockFlush,Porcine,,PF,) 10 unit/mL injection, Infuse 5 mL (50 Units) into a venous catheter once daily., Disp: , Rfl:     hydrocortisone 2.5 % cream, APPLY TOPICALLY TWICE DAILY TO DRY PATCHES ON FACE FOR UP TO 2 WEEKS AT A TIME. DO NOT USE MORE THAN 14 DAYS PER MONTH, Disp: 30 g, Rfl: 3    hydrOXYzine HCL (Atarax) 25 mg tablet, Take by mouth., Disp: , Rfl:     ibandronate (Boniva) 150 mg tablet, Take 1 tablet (150 mg) by mouth every 30 (thirty) days. Take in morning with full glass of water on an empty stomach. No food, drink, meds, or lying down for 60 minutes after., Disp: , Rfl:     ibuprofen 600 mg tablet, Take by mouth., Disp: , Rfl:     ketoconazole (NIZOral) 2 % shampoo, MASSAGE INTO SCALP AND LET SIT 5 TO 10 MINUTES BEFORE RINSING ONCE A WEEK, Disp: 120 mL, Rfl: 10    lidocaine (Lidoderm) 5 % patch, APPLY 1 PATCH TO INTACT SKIN REMOVE AFTER 12 HOURS EXTERNALLY ONCE A DAY FOR 30 DAYS, Disp: , Rfl:     loratadine (Claritin) 10 mg tablet, Take 1 tablet (10 mg) by mouth once daily., Disp: , Rfl:     multivitamin tablet, Take 1 tablet by mouth once daily., Disp: , Rfl:     pantoprazole (ProtoNix) 40 mg EC tablet, Take 1 tablet (40 mg) by mouth., Disp: , Rfl:     potassium chloride CR (Klor-Con M20) 20 mEq ER tablet, Take 1 tablet (20 mEq) by mouth once daily., Disp: , Rfl:     propranolol XL (Innopran XL) 80 mg 24 hr capsule, Take 1 capsule (80 mg) by mouth once daily., Disp: , Rfl:     Saline NasaL 0.65 % nasal spray, PLACE 2 DROPS IN BOTH NOSTRILS EVERY 2 HOURS AS NEEDED, Disp: , Rfl:     simethicone (Mylicon) 125 mg chewable tablet, Chew 1 tablet (125 mg) every 6 hours if needed. (Patient not taking: Reported on 4/21/2025), Disp: , Rfl:     sodium chloride 0.9% (sodium chloride) flush, Infuse 10 mL into a venous catheter once  daily., Disp: , Rfl:     sucralfate (Carafate) 1 gram tablet, TAKE 1 TABLET BY MOUTH TWICE A DAY ON AN EMPTY STOMACH (Patient not taking: Reported on 4/21/2025), Disp: , Rfl:     Tecfidera 240 mg capsule,delayed release(DR/EC) capsule, TAKE 1 CAPSULE BY MOUTH 2 TIMES A DAY, Disp: 60 capsule, Rfl: 11    tiZANidine (Zanaflex) 4 mg tablet, Take 1 tablet (4 mg) by mouth 3 times a day. TAKE 1 TABLET BY MOUTH 3 TIMES DAILY AS NEEDED FOR BACK AND LEG CRAMPS, Disp: , Rfl:     traMADol (Ultram) 50 mg tablet, Take 1 tablet (50 mg) by mouth every 8 hours if needed., Disp: , Rfl:     traZODone (Desyrel) 50 mg tablet, Take 2 tablets (100 mg) by mouth once daily at bedtime., Disp: 60 tablet, Rfl: 5    valsartan (Diovan) 40 mg tablet, Take 1 tablet (40 mg) by mouth 2 times a day., Disp: , Rfl:   [4]   Allergies  Allergen Reactions    Adhesive Unknown    Diphenhydramine Unknown    Gadolinium-Containing Contrast Media Other     Tongue/Lip tingling    Penicillins Unknown    Pollen Extracts Unknown     Hayfever, nasal congestion, headache, cough

## 2025-04-23 ENCOUNTER — TREATMENT (OUTPATIENT)
Dept: PHYSICAL THERAPY | Facility: CLINIC | Age: 60
End: 2025-04-23
Payer: MEDICARE

## 2025-04-23 DIAGNOSIS — M48.062 SPINAL STENOSIS OF LUMBAR REGION WITH NEUROGENIC CLAUDICATION: ICD-10-CM

## 2025-04-23 DIAGNOSIS — G35 MULTIPLE SCLEROSIS (MULTI): ICD-10-CM

## 2025-04-23 DIAGNOSIS — M51.362 DEGENERATION OF INTERVERTEBRAL DISC OF LUMBAR REGION WITH DISCOGENIC BACK PAIN AND LOWER EXTREMITY PAIN: ICD-10-CM

## 2025-04-23 DIAGNOSIS — M54.16 LUMBAR RADICULOPATHY, ACUTE: ICD-10-CM

## 2025-04-23 PROCEDURE — 97110 THERAPEUTIC EXERCISES: CPT | Mod: GP | Performed by: PHYSICAL THERAPIST

## 2025-04-23 NOTE — PROGRESS NOTES
Physical Therapy  Physical Therapy Orthopedic Evaluation    Patient Name: Genny Grant  MRN: [id number]  Today's Date: 4/23/2025  Time Calculation  Start Time: 0900  Stop Time: 0941  Time Calculation (min): 41 min       PT Therapeutic Procedures Time Entry  Therapeutic Exercise Time Entry: 40       Insurance:  Visit number: 2  Authorization info: auth req  Insurance Type: Payor: [employer] MEDICARE / Plan: [employer] DUAL ADVANTAGE / Product Type: *No Product type* /      Current Problem     Problem List Items Addressed This Visit           ICD-10-CM    Multiple sclerosis (Multi) G35    Lumbar radiculopathy, acute M54.16    Spinal stenosis of lumbar region with neurogenic claudication M48.062    Degeneration of intervertebral disc of lumbar region with discogenic back pain and lower extremity pain M51.362       Surgery:    Referred by:   Homar Wheeler MD     Precautions:   MS, fibromyalgia, osteoporosis, RA, former CA, HBP, High cholesterol, heart palpitations, DWIGHT, neuropathy    Subjective:   Subjective   Chief Complaint: Low back and leg pain  Onset: chronic   VARSHA: gradual worsening    General:     Current Condition:  4/23 some pain is better, back and legs are sore today. She lost her exercise paper but remembered a few to do at home    Pain:     Location: Low back ad legs   Rating: Best-5, Current-5, Worst-10  Description: aggravating consistent pain, pain in legs can be short L>R at times   Aggravating Factors:  standing and walking for longer periods of time  Relieving Factors:  ibuprofen,tylenol, stretching, lidocaine patches     Relevant Information (PMH & Previous Tests/Imaging): MRI 2024 Lumbar spondylosis as described worst at L3-L4 with moderate canal  narrowing.  Small amount of edema in the left L4 and L5 pedicles, likely mechanical stress.     Prior Level of Function (PLOF)  Patient previously independent with all ADLs  Exercise/Physical Activity: walking and dancing  Work/School: Retired teacher      Patient's Goal(s) for Therapy: improve walking better with longer distance    Red Flags: Do you have any of the following? No  Fever/chills, unexplained weight changes, dizziness/fainting, unexplained change in bowel or bladder functions, unexplained malaise or muscle weakness, night pain/sweats, numbness or tingling    Objective:  Objective     Palpation/Observation  TTP through lower R >L paraspinals  Posture  Presents in motor chair, no accentuated curvatures, rigid standing posture with single point cane   Special Testing  + SLR, asis compression and gap testing, sacral rock  ROM  4/23/2025  Trunk AROM  Flexion 25%  Ext 25%  L rot 25-50%  R rot 25%   All are painful  HS pop angle 40deg abdifatah  Hip flexion 100deg abdifatah  Abdifatah hip rotation IR and ER limited by 10deg, log roll limited by 10 deg abdifatah  No restrictions with hip ABD/ADD  Central PA glides show restriction withLower lumbar mobility and pain is present    Strength  4/23/2025  Abdifatah Knee ext and flex 3/5 unable to tolerate resistance, abdifatah hip flex 3+/5, she cannot perform standing heel or calf raise, 3/5 seated heel and calf raise  Sensation  L>R radicular pain  Reflexes  1+ achilles patellar DTR, neg clonus    Transfers: slow, abdifatah UE assist, cane assist, mild guarding  Gait: uses motor chair, reports she ambulates at home with Single point cane for short distances  SL Balance:   DL Squat: sit to stand antalgic with UE assist, and decreased Lower extremity push, more of a pull/push with arms to stand  SL Squat:      Treatments:  Ther-EX:  Swiss ball flexion x 15  Bent knee fallout uni x15 ea   Clamshells YTB x 20  Supine march 2 x 10  Pelvic tilt x 10  Seated HS YTB 2 x 8  Bike x 5'  Seated flexion roll out x 10        Manual:    Modalities:  Discussed ice and heat       PT Therapeutic Procedures Time Entry  Therapeutic Exercise Time Entry: 40       EDUCATION: Home exercise program, plan of care, activity modifications, pain management, and injury pathology      Code: [id number]     Medical History Form: Reviewed (scanned into chart)  Reviewed medical form, [place], Falls risk, Caodaism beliefs, PHQ     Screening  Frequency  Date Last Completed   Spiritual and Cultural Beliefs   Screening each visit or episode of care 4/1/2025   Falls Risk Screening every ambulatory visit    Pain Screening annually at primary care visit  11/22/2024   Domestic Violence screening annually at primary care visit 7/17/2024   Depression Screening annually in the primary care setting 6/10/2024   Suicide Risk Screening annually in the primary care setting    Nutrition and Food Insecurity   Screening at least annually at primary care visit  6/10/2024   Key Learner annually in the primary care setting 4/1/2025   Drug Screen  4/21/2025 11:41 AM   Alcohol Screen  4/21/2025 11:41 AM   Advance Directive  7/17/2024     Time Calculation  Start Time: 0900  Stop Time: 0941  Time Calculation (min): 41 min    PT Therapeutic Procedures Time Entry  Therapeutic Exercise Time Entry: 40          Assessment: Patient presents with signs and symptoms consistent with Lumbar stenosis with DDD and Radicular pain, complex eval secondary to co morbidities, resulting in limited participation in pain-free ADLs and inability to perform at their prior level of function. She was fatigued with exercise, slow improvement with neutral spine based exercise. Conformabilities do effect her healing potential     Complexity:high  Prognosis: fair  Response to care: fair  Clinical Presentation: Stable and/or uncomplicated characteristics  Personal Factors: Comorbidities    Problem List:  Pain  Function  Mobility  Strength  Plan:     Planned Interventions include: therapeutic exercise, self-care home management, manual therapy, therapeutic activities, gait training, neuromuscular coordination, vasopneumatic, dry needling, aquatic therapy    Next Treatment:progress ROM, Lower extremity and lumbar flexibility  Frequency: 1-2 x  Week  Duration: 8 Weeks  Goals: Set and discussed today  4 weeks  Patient to have pain less than 5/10 for improved sit to stand ability  Patient to be independent with HEP and progression for improved carryover  Improved ability to perform walking 5 min with single point cane for improved mobility  Improved tolerance with rere knee ext to 3+/5 for improved weightbearing stability and strength    8 weeks  Patient to have improved JAISON score by 15 points for improved function at home  Patient to have pain less than 4/10 for improved ADL performance up to 20 min  Improved Walking 10 min for improved functional independence for better function with daily activities  Patient to perform sit to stand 5 x in 1 minutes for improved lower extremity strength and transfers for motor chair    Plan of care was developed with input and agreement by the patient      Richie Moreno, PT

## 2025-05-16 ENCOUNTER — TREATMENT (OUTPATIENT)
Dept: PHYSICAL THERAPY | Facility: CLINIC | Age: 60
End: 2025-05-16
Payer: MEDICARE

## 2025-05-16 DIAGNOSIS — M54.16 LUMBAR RADICULOPATHY, ACUTE: ICD-10-CM

## 2025-05-16 DIAGNOSIS — G35 MULTIPLE SCLEROSIS (MULTI): ICD-10-CM

## 2025-05-16 DIAGNOSIS — M48.062 SPINAL STENOSIS OF LUMBAR REGION WITH NEUROGENIC CLAUDICATION: Primary | ICD-10-CM

## 2025-05-16 DIAGNOSIS — M51.362 DEGENERATION OF INTERVERTEBRAL DISC OF LUMBAR REGION WITH DISCOGENIC BACK PAIN AND LOWER EXTREMITY PAIN: ICD-10-CM

## 2025-05-16 PROCEDURE — 97110 THERAPEUTIC EXERCISES: CPT | Mod: GP | Performed by: PHYSICAL THERAPIST

## 2025-05-16 NOTE — PROGRESS NOTES
Physical Therapy  Physical Therapy Orthopedic Evaluation    Patient Name: Genny Grant  MRN: [id number]  Today's Date: 5/16/2025  Time Calculation  Start Time: 0944  Stop Time: 1000  Time Calculation (min): 16 min       PT Therapeutic Procedures Time Entry  Therapeutic Exercise Time Entry: 15       Insurance:  Visit number: 3  Authorization info: 6 VISITS, 4/1/25-5/30/25  Insurance Type: Payor: [employer] MEDICARE / Plan: [employer] DUAL ADVANTAGE / Product Type: *No Product type* /      Current Problem     Problem List Items Addressed This Visit           ICD-10-CM    Multiple sclerosis (Multi) G35    Lumbar radiculopathy, acute M54.16    Spinal stenosis of lumbar region with neurogenic claudication - Primary M48.062    Degeneration of intervertebral disc of lumbar region with discogenic back pain and lower extremity pain M51.362         Surgery:    Referred by:   Homar Wheeler MD     Precautions:   MS, fibromyalgia, osteoporosis, RA, former CA, HBP, High cholesterol, heart palpitations, DWIGHT, neuropathy    Subjective:   Subjective - Low back sore in the AM, occasional inner thigh/groin pain    General:     Chief Complaint: Low back and leg pain  Onset: chronic   VARSHA: gradual worsening    Pain:     Location: Low back 4/10 this AM    Relevant Information (PMH & Previous Tests/Imaging): MRI 2024 Lumbar spondylosis as described worst at L3-L4 with moderate canal  narrowing.  Small amount of edema in the left L4 and L5 pedicles, likely mechanical stress.     Prior Level of Function (PLOF)  Patient previously independent with all ADLs  Exercise/Physical Activity: walking and dancing  Work/School: Retired teacher     Patient's Goal(s) for Therapy: improve walking better with longer distance    Red Flags: Do you have any of the following? No  Fever/chills, unexplained weight changes, dizziness/fainting, unexplained change in bowel or bladder functions, unexplained malaise or muscle weakness, night pain/sweats,  numbness or tingling    Objective:  Objective     Palpation/Observation  TTP through lower R >L paraspinals  Posture  Presents in motor chair, no accentuated curvatures, rigid standing posture with single point cane   Special Testing  + SLR, asis compression and gap testing, sacral rock  ROM  5/16/2025  Trunk AROM  Flexion 25%  Ext 25%  L rot 25-50%  R rot 25%   All are painful  HS pop angle 40deg abdifatah  Hip flexion 100deg abdifatah  Abdifatah hip rotation IR and ER limited by 10deg, log roll limited by 10 deg abdifatah  No restrictions with hip ABD/ADD  Central PA glides show restriction withLower lumbar mobility and pain is present    Strength  5/16/2025  Abdifatah Knee ext and flex 3/5 unable to tolerate resistance, abdifatah hip flex 3+/5, she cannot perform standing heel or calf raise, 3/5 seated heel and calf raise  Sensation  L>R radicular pain  Reflexes  1+ achilles patellar DTR, neg clonus    Transfers: slow, abdifatah UE assist, cane assist, mild guarding  Gait: uses motor chair, reports she ambulates at home with Single point cane for short distances  SL Balance:   DL Squat: sit to stand antalgic with UE assist, and decreased Lower extremity push, more of a pull/push with arms to stand  SL Squat:      Treatments:  Ther-EX:  Hip abd x 15 ea  IR hip stretch with strap and legs straight x 10        Swiss ball flexion x 15  Bent knee fallout uni x15 ea   Clamshells YTB x 20  Supine march 2 x 10  Pelvic tilt x 10  Seated HS YTB 2 x 8  Bike x 5'  Seated flexion roll out x 10        Manual:    Modalities:  Discussed ice and heat       PT Therapeutic Procedures Time Entry  Therapeutic Exercise Time Entry: 15       EDUCATION: Home exercise program, plan of care, activity modifications, pain management, and injury pathology     Code: [id number]     Medical History Form: Reviewed (scanned into chart)  Reviewed medical form, [place], Falls risk, Holiness beliefs, PHQ     Screening  Frequency  Date Last Completed   Spiritual and Cultural Beliefs    Screening each visit or episode of care 4/1/2025   Falls Risk Screening every ambulatory visit    Pain Screening annually at primary care visit  11/22/2024   Domestic Violence screening annually at primary care visit 7/17/2024   Depression Screening annually in the primary care setting 6/10/2024   Suicide Risk Screening annually in the primary care setting    Nutrition and Food Insecurity   Screening at least annually at primary care visit  6/10/2024   Key Lear[place] annually in the primary care setting 4/1/2025   Drug Screen  4/21/2025 11:41 AM   Alcohol Screen  4/21/2025 11:41 AM   Advance Directive  7/17/2024     Time Calculation  Start Time: 0944  Stop Time: 1000  Time Calculation (min): 16 min    PT Therapeutic Procedures Time Entry  Therapeutic Exercise Time Entry: 15          Assessment: Patient presents with signs and symptoms consistent with Lumbar stenosis with DDD and Radicular pain, complex eval secondary to co morbidities, resulting in limited participation in pain-free ADLs and inability to perform at their prior level of function. She arrived 30 min late, session adjusted, progressing well, still low back and mild radicular thigh pain  Complexity:high  Prognosis: fair  Response to care: fair  Clinical Presentation: Stable and/or uncomplicated characteristics  Personal Factors: Comorbidities    Problem List:  Pain  Function  Mobility  Strength  Plan:     Planned Interventions include: therapeutic exercise, self-care home management, manual therapy, therapeutic activities, gait training, neuromuscular coordination, vasopneumatic, dry needling, aquatic therapy    Next Treatment:progress ROM, Lower extremity and lumbar flexibility  Frequency: 1-2 x Week  Duration: 8 Weeks  Goals: Set and discussed today  4 weeks  Patient to have pain less than 5/10 for improved sit to stand ability  Patient to be independent with HEP and progression for improved carryover  Improved ability to perform walking 5 min with  single point cane for improved mobility  Improved tolerance with rere knee ext to 3+/5 for improved weightbearing stability and strength    8 weeks  Patient to have improved JAISON score by 15 points for improved function at home  Patient to have pain less than 4/10 for improved ADL performance up to 20 min  Improved Walking 10 min for improved functional independence for better function with daily activities  Patient to perform sit to stand 5 x in 1 minutes for improved lower extremity strength and transfers for motor chair    Plan of care was developed with input and agreement by the patient      Richie Moreno, PT

## 2025-05-20 DIAGNOSIS — F51.01 PRIMARY INSOMNIA: ICD-10-CM

## 2025-05-21 ENCOUNTER — TREATMENT (OUTPATIENT)
Dept: PHYSICAL THERAPY | Facility: CLINIC | Age: 60
End: 2025-05-21
Payer: MEDICARE

## 2025-05-21 DIAGNOSIS — M48.062 SPINAL STENOSIS OF LUMBAR REGION WITH NEUROGENIC CLAUDICATION: ICD-10-CM

## 2025-05-21 DIAGNOSIS — M51.362 DEGENERATION OF INTERVERTEBRAL DISC OF LUMBAR REGION WITH DISCOGENIC BACK PAIN AND LOWER EXTREMITY PAIN: ICD-10-CM

## 2025-05-21 DIAGNOSIS — M54.16 LUMBAR RADICULOPATHY, ACUTE: ICD-10-CM

## 2025-05-21 DIAGNOSIS — G35 MULTIPLE SCLEROSIS (MULTI): ICD-10-CM

## 2025-05-21 PROCEDURE — 97140 MANUAL THERAPY 1/> REGIONS: CPT | Mod: GP | Performed by: PHYSICAL THERAPIST

## 2025-05-21 PROCEDURE — 97110 THERAPEUTIC EXERCISES: CPT | Mod: GP | Performed by: PHYSICAL THERAPIST

## 2025-05-21 RX ORDER — TRAZODONE HYDROCHLORIDE 50 MG/1
100 TABLET ORAL NIGHTLY
Qty: 60 TABLET | Refills: 5 | Status: SHIPPED | OUTPATIENT
Start: 2025-05-21 | End: 2025-11-17

## 2025-05-21 NOTE — PROGRESS NOTES
Physical Therapy  Physical Therapy Orthopedic Evaluation    Patient Name: Genny Grant  MRN: [id number]  Today's Date: 5/21/2025  Time Calculation  Start Time: 1314  Stop Time: 1352  Time Calculation (min): 38 min       PT Therapeutic Procedures Time Entry  Manual Therapy Time Entry: 8  Therapeutic Exercise Time Entry: 30       Insurance:  Visit number: 4  Authorization info: 6 VISITS, 4/1/25-5/30/25  Insurance Type: Payor: [employer] MEDICARE / Plan: [employer] DUAL ADVANTAGE / Product Type: *No Product type* /      Current Problem     Problem List Items Addressed This Visit           ICD-10-CM    Multiple sclerosis (Multi) G35    Lumbar radiculopathy, acute M54.16    Spinal stenosis of lumbar region with neurogenic claudication M48.062    Degeneration of intervertebral disc of lumbar region with discogenic back pain and lower extremity pain M51.362         Surgery:    Referred by:   Homar Wheeler MD     Precautions:   MS, fibromyalgia, osteoporosis, RA, former CA, HBP, High cholesterol, heart palpitations, DWIGHT, neuropathy    Subjective:   Subjective - Low back is OK, some neck pain today    General:     Chief Complaint: Low back and leg pain  Onset: chronic   VARSHA: gradual worsening    Pain:     Location: Low back 4/10 this AM    Relevant Information (PMH & Previous Tests/Imaging): MRI 2024 Lumbar spondylosis as described worst at L3-L4 with moderate canal  narrowing.  Small amount of edema in the left L4 and L5 pedicles, likely mechanical stress.     Prior Level of Function (PLOF)  Patient previously independent with all ADLs  Exercise/Physical Activity: walking and dancing  Work/School: Retired teacher     Patient's Goal(s) for Therapy: improve walking better with longer distance    Red Flags: Do you have any of the following? No  Fever/chills, unexplained weight changes, dizziness/fainting, unexplained change in bowel or bladder functions, unexplained malaise or muscle weakness, night pain/sweats, numbness  "or tingling    Objective:  Objective     Palpation/Observation  TTP through lower R >L paraspinals  Posture  Presents in motor chair, no accentuated curvatures, rigid standing posture with single point cane   Special Testing  + SLR, asis compression and gap testing, sacral rock  ROM  5/21/2025  Trunk AROM  Flexion 25%  Ext 25%  L rot 25-50%  R rot 25%   All are painful  HS pop angle 40deg abdifatah  Hip flexion 100deg abdifatah  Abdifatah hip rotation IR and ER limited by 10deg, log roll limited by 10 deg abdifatah  No restrictions with hip ABD/ADD  Central PA glides show restriction withLower lumbar mobility and pain is present    Strength  5/21/2025  Abdifatah Knee ext and flex 3/5 unable to tolerate resistance, abdifatah hip flex 3+/5, she cannot perform standing heel or calf raise, 3/5 seated heel and calf raise  Sensation  L>R radicular pain  Reflexes  1+ achilles patellar DTR, neg clonus    Transfers: slow, abdifatah UE assist, cane assist, mild guarding  Gait: uses motor chair, reports she ambulates at home with Single point cane for short distances  SL Balance:   DL Squat: sit to stand antalgic with UE assist, and decreased Lower extremity push, more of a pull/push with arms to stand  SL Squat:      Treatments:  Ther-EX:  Bike x 5'  Hip add x 15 3\"  Clamshells GTB x 15  Swiss ball flexion x 15  Seated HS YTB 2 x 8   Trial of walking for improved gait mechanics         Manual:  Abdifatah Hip and knee PROM  Modalities:  Discussed ice and heat       PT Therapeutic Procedures Time Entry  Manual Therapy Time Entry: 8  Therapeutic Exercise Time Entry: 30       EDUCATION: Home exercise program, plan of care, activity modifications, pain management, and injury pathology     Code: [id number]     Medical History Form: Reviewed (scanned into chart)  Reviewed medical form, [place], [place] risk, Islam beliefs, PHQ     Screening  Frequency  Date Last Completed   Spiritual and Cultural Beliefs   Screening each visit or episode of care 4/1/2025   Falls Risk " Screening every ambulatory visit    Pain Screening annually at primary care visit  11/22/2024   Domestic Violence screening annually at primary care visit 7/17/2024   Depression Screening annually in the primary care setting 6/10/2024   Suicide Risk Screening annually in the primary care setting    Nutrition and Food Insecurity   Screening at least annually at primary care visit  6/10/2024   Key Learner annually in the primary care setting 4/1/2025   Drug Screen  4/21/2025 11:41 AM   Alcohol Screen  4/21/2025 11:41 AM   Advance Directive  7/17/2024     Time Calculation  Start Time: 1314  Stop Time: 1352  Time Calculation (min): 38 min    PT Therapeutic Procedures Time Entry  Manual Therapy Time Entry: 8  Therapeutic Exercise Time Entry: 30          Assessment: Patient presents with signs and symptoms consistent with Lumbar stenosis with DDD and Radicular pain, complex eval secondary to co morbidities, resulting in limited participation in pain-free ADLs and inability to perform at their prior level of function. She has made slow progress, she still fatigues easy with exercise   Complexity:high  Prognosis: fair  Response to care: fair  Clinical Presentation: Stable and/or uncomplicated characteristics  Personal Factors: Comorbidities    Problem List:  Pain  Function  Mobility  Strength  Plan:     Planned Interventions include: therapeutic exercise, self-care home management, manual therapy, therapeutic activities, gait training, neuromuscular coordination, vasopneumatic, dry needling, aquatic therapy    Next Treatment:progress ROM, Lower extremity and lumbar flexibility  Frequency: 1-2 x Week  Duration: 8 Weeks  Goals: Set and discussed today  4 weeks  Patient to have pain less than 5/10 for improved sit to stand ability  Patient to be independent with HEP and progression for improved carryover  Improved ability to perform walking 5 min with single point cane for improved mobility  Improved tolerance with rere knee  ext to 3+/5 for improved weightbearing stability and strength    8 weeks  Patient to have improved JAISON score by 15 points for improved function at home  Patient to have pain less than 4/10 for improved ADL performance up to 20 min  Improved Walking 10 min for improved functional independence for better function with daily activities  Patient to perform sit to stand 5 x in 1 minutes for improved lower extremity strength and transfers for motor chair    Plan of care was developed with input and agreement by the patient      Richie Moreno, PT

## 2025-05-26 DIAGNOSIS — G35 MULTIPLE SCLEROSIS (MULTI): ICD-10-CM

## 2025-05-27 RX ORDER — BACLOFEN 20 MG/1
20 TABLET ORAL 3 TIMES DAILY
Qty: 90 TABLET | Refills: 11 | Status: SHIPPED | OUTPATIENT
Start: 2025-05-27

## 2025-05-28 ENCOUNTER — TREATMENT (OUTPATIENT)
Dept: PHYSICAL THERAPY | Facility: CLINIC | Age: 60
End: 2025-05-28
Payer: MEDICARE

## 2025-05-28 DIAGNOSIS — M48.062 SPINAL STENOSIS OF LUMBAR REGION WITH NEUROGENIC CLAUDICATION: ICD-10-CM

## 2025-05-28 DIAGNOSIS — M51.362 DEGENERATION OF INTERVERTEBRAL DISC OF LUMBAR REGION WITH DISCOGENIC BACK PAIN AND LOWER EXTREMITY PAIN: ICD-10-CM

## 2025-05-28 DIAGNOSIS — M54.16 LUMBAR RADICULOPATHY, ACUTE: ICD-10-CM

## 2025-05-28 DIAGNOSIS — G35 MULTIPLE SCLEROSIS (MULTI): ICD-10-CM

## 2025-05-28 PROCEDURE — 97110 THERAPEUTIC EXERCISES: CPT | Mod: GP | Performed by: PHYSICAL THERAPIST

## 2025-05-28 NOTE — PROGRESS NOTES
Physical Therapy  Physical Therapy Orthopedic Evaluation    Patient Name: Genny Grant  MRN: [id number]  Today's Date: 5/28/2025  Time Calculation  Start Time: 0945  Stop Time: 1030  Time Calculation (min): 45 min       PT Therapeutic Procedures Time Entry  Therapeutic Exercise Time Entry: 40       Insurance:  Visit number: 5  Authorization info: 6 VISITS, 4/1/25-5/30/25  Insurance Type: Payor: [employer] MEDICARE / Plan: [employer] DUAL ADVANTAGE / Product Type: *No Product type* /      Current Problem     Problem List Items Addressed This Visit           ICD-10-CM    Multiple sclerosis (Multi) G35    Lumbar radiculopathy, acute M54.16    Spinal stenosis of lumbar region with neurogenic claudication M48.062    Degeneration of intervertebral disc of lumbar region with discogenic back pain and lower extremity pain M51.362         Surgery:    Referred by:   Homar Wheeler MD     Precautions:   MS, fibromyalgia, osteoporosis, RA, former CA, HBP, High cholesterol, heart palpitations, DWIGHT, neuropathy    Subjective:   Subjective - Low back is sore, weather has been affecting her     General:     Chief Complaint: Low back and leg pain  Onset: chronic   VARSHA: gradual worsening    Pain:     Location: Low back 4/10 this AM    Relevant Information (PMH & Previous Tests/Imaging): MRI 2024 Lumbar spondylosis as described worst at L3-L4 with moderate canal  narrowing.  Small amount of edema in the left L4 and L5 pedicles, likely mechanical stress.     Prior Level of Function (PLOF)  Patient previously independent with all ADLs  Exercise/Physical Activity: walking and dancing  Work/School: Retired teacher     Patient's Goal(s) for Therapy: improve walking better with longer distance    Red Flags: Do you have any of the following? No  Fever/chills, unexplained weight changes, dizziness/fainting, unexplained change in bowel or bladder functions, unexplained malaise or muscle weakness, night pain/sweats, numbness or  "tingling    Objective:  Objective     Palpation/Observation  TTP through lower R >L paraspinals  Posture  Presents in motor chair, no accentuated curvatures, rigid standing posture with single point cane   Special Testing  + SLR, asis compression and gap testing, sacral rock  ROM  5/28/2025  Trunk AROM  Flexion 25%  Ext 25%  L rot 25-50%  R rot 25%   All are painful  HS pop angle 40deg abdifatah  Hip flexion 100deg abdifatah  Abdifatah hip rotation IR and ER limited by 10deg, log roll limited by 10 deg abdifatah  No restrictions with hip ABD/ADD  Central PA glides show restriction withLower lumbar mobility and pain is present    Strength  5/28/2025  Abdifatah Knee ext and flex 3/5 unable to tolerate resistance, abdifatah hip flex 3+/5, she cannot perform standing heel or calf raise, 3/5 seated heel and calf raise  Sensation  L>R radicular pain  Reflexes  1+ achilles patellar DTR, neg clonus    Transfers: slow, abdifatah UE assist, cane assist, mild guarding  Gait: uses motor chair, reports she ambulates at home with Single point cane for short distances  SL Balance:   DL Squat: sit to stand antalgic with UE assist, and decreased Lower extremity push, more of a pull/push with arms to stand  SL Squat:      Treatments:  Ther-EX:  Seated ball roll out 3 way 5 x 15\" ea   Seated extension AAROM cane flexion x 15  Supine trunk rot x 12 ea   Clamshells GTB x 15 ea  Bent knee fall out 2 lb x 15 ea   Seated LAQ 2 lb x 15 ea  Hip add x 15 3\"          Manual:    Modalities:  Discussed ice and heat       PT Therapeutic Procedures Time Entry  Therapeutic Exercise Time Entry: 40       EDUCATION: Home exercise program, plan of care, activity modifications, pain management, and injury pathology     Code: [email or web address]     Medical History Form: Reviewed (scanned into chart)  Reviewed medical form, [place], [place] risk, Anglican beliefs, PHQ     Screening  Frequency  Date Last Completed   Spiritual and Cultural Beliefs   Screening each visit or episode of care 4/1/2025   Falls " Risk Screening every ambulatory visit    Pain Screening annually at primary care visit  11/22/2024   Domestic Violence screening annually at primary care visit 7/17/2024   Depression Screening annually in the primary care setting 6/10/2024   Suicide Risk Screening annually in the primary care setting    Nutrition and Food Insecurity   Screening at least annually at primary care visit  6/10/2024   Key Learner annually in the primary care setting 4/1/2025   Drug Screen  4/21/2025 11:41 AM   Alcohol Screen  4/21/2025 11:41 AM   Advance Directive  7/17/2024     Time Calculation  Start Time: 0945  Stop Time: 1030  Time Calculation (min): 45 min    PT Therapeutic Procedures Time Entry  Therapeutic Exercise Time Entry: 40          Assessment: Patient presents with signs and symptoms consistent with Lumbar stenosis with DDD and Radicular pain, complex eval secondary to co morbidities, resulting in limited participation in pain-free ADLs and inability to perform at their prior level of function. She still fatigues easy but does have improved posture with cuing. Slow progress with goals  Complexity:high  Prognosis: fair  Response to care: fair  Clinical Presentation: Stable and/or uncomplicated characteristics  Personal Factors: Comorbidities    Problem List:  Pain  Function  Mobility  Strength  Plan:     Planned Interventions include: therapeutic exercise, self-care home management, manual therapy, therapeutic activities, gait training, neuromuscular coordination, vasopneumatic, dry needling, aquatic therapy    Next Treatment:progress ROM, Lower extremity and lumbar flexibility  Frequency: 1-2 x Week  Duration: 8 Weeks  Goals: Set and discussed today  4 weeks  Patient to have pain less than 5/10 for improved sit to stand ability- MET  Patient to be independent with HEP and progression for improved carryover- MET  Improved ability to perform walking 5 min with single point cane for improved mobility- PROGRESS  Improved  tolerance with rere knee ext to 3+/5 for improved weightbearing stability and strength- PROGRESS    8 weeks  Patient to have improved JAISON score by 15 points for improved function at home- PROGRESS  Patient to have pain less than 4/10 for improved ADL performance up to 20 min- PROGRESS  Improved Walking 10 min for improved functional independence for better function with daily activities- PROGRESS  Patient to perform sit to stand 5 x in 30 seconds for improved lower extremity strength and transfers for motor chair-PROGRESS  Plan of care was developed with input and agreement by the patient      Richie Moreno, PT

## 2025-05-30 ENCOUNTER — TREATMENT (OUTPATIENT)
Dept: PHYSICAL THERAPY | Facility: CLINIC | Age: 60
End: 2025-05-30
Payer: MEDICARE

## 2025-05-30 DIAGNOSIS — M51.362 DEGENERATION OF INTERVERTEBRAL DISC OF LUMBAR REGION WITH DISCOGENIC BACK PAIN AND LOWER EXTREMITY PAIN: ICD-10-CM

## 2025-05-30 DIAGNOSIS — M48.062 SPINAL STENOSIS OF LUMBAR REGION WITH NEUROGENIC CLAUDICATION: ICD-10-CM

## 2025-05-30 DIAGNOSIS — G35 MULTIPLE SCLEROSIS (MULTI): ICD-10-CM

## 2025-05-30 DIAGNOSIS — M54.16 LUMBAR RADICULOPATHY, ACUTE: ICD-10-CM

## 2025-05-30 PROCEDURE — 97110 THERAPEUTIC EXERCISES: CPT | Mod: GP | Performed by: PHYSICAL THERAPIST

## 2025-05-30 NOTE — PROGRESS NOTES
Physical Therapy  Physical Therapy Orthopedic Evaluation    Patient Name: Genny Grant  MRN: [id number]  Today's Date: 5/30/2025  Time Calculation  Start Time: 1130  Stop Time: 1215  Time Calculation (min): 45 min       PT Therapeutic Procedures Time Entry  Therapeutic Exercise Time Entry: 45       Insurance:  Visit number: 6  Authorization info: 6 VISITS, 4/1/25-5/30/25  Insurance Type: Payor: [employer] MEDICARE / Plan: [employer] DUAL ADVANTAGE / Product Type: *No Product type* /      Current Problem     Problem List Items Addressed This Visit           ICD-10-CM       Neuro    Multiple sclerosis (Multi) G35    Lumbar radiculopathy, acute M54.16    Spinal stenosis of lumbar region with neurogenic claudication M48.062    Degeneration of intervertebral disc of lumbar region with discogenic back pain and lower extremity pain M51.362         Surgery:    Referred by:   Homar Wheeler MD     Precautions:   MS, fibromyalgia, osteoporosis, RA, former CA, HBP, High cholesterol, heart palpitations, DWIGHT, neuropathy    Subjective:   Subjective - Pt state her back and hip sore today but overall she is fine.     General:     Chief Complaint: Low back and leg pain  Onset: chronic   VARSHA: gradual worsening    Pain:     Location: Low back 4/10 this AM    Relevant Information (PMH & Previous Tests/Imaging): MRI 2024 Lumbar spondylosis as described worst at L3-L4 with moderate canal  narrowing.  Small amount of edema in the left L4 and L5 pedicles, likely mechanical stress.     Prior Level of Function (PLOF)  Patient previously independent with all ADLs  Exercise/Physical Activity: walking and dancing  Work/School: Retired teacher     Patient's Goal(s) for Therapy: improve walking better with longer distance    Red Flags: Do you have any of the following? No  Fever/chills, unexplained weight changes, dizziness/fainting, unexplained change in bowel or bladder functions, unexplained malaise or muscle weakness, night pain/sweats,  numbness or tingling    Objective:  Objective     Palpation/Observation  TTP through lower R >L paraspinals  Posture  Presents in motor chair, no accentuated curvatures, rigid standing posture with single point cane   Special Testing  + SLR, asis compression and gap testing, sacral rock  ROM  5/30/2025  Trunk AROM  Flexion 25%  Ext 25%  L rot 25-50%  R rot 25%   All are painful  HS pop angle 40deg abdifatah  Hip flexion 100deg abdifatah  Abdifatah hip rotation IR and ER limited by 10deg, log roll limited by 10 deg abdifatah  No restrictions with hip ABD/ADD  Central PA glides show restriction withLower lumbar mobility and pain is present    Strength  5/30/2025  Abdifatah Knee ext and flex 3/5 unable to tolerate resistance, abdifatah hip flex 3+/5, she cannot perform standing heel or calf raise, 3/5 seated heel and calf raise  Sensation  L>R radicular pain  Reflexes  1+ achilles patellar DTR, neg clonus    Transfers: slow, abdifatah UE assist, cane assist, mild guarding  Gait: uses motor chair, reports she ambulates at home with Single point cane for short distances  SL Balance:   DL Squat: sit to stand antalgic with UE assist, and decreased Lower extremity push, more of a pull/push with arms to stand  SL Squat:      Treatments:  Ther-EX:  Seated ball roll out 3 way 5 x   Bridges 3 x 10  Trunk rotation 2 x 20  Clam shell 2 x 15   Knee fall out 2 x10  Pallof 2 x 10 with YB   Shoulder flexion with cane # 2 x 10   YTB Abdifatah ER  2 x 10     Manual:    Modalities:  Discussed ice and heat       PT Therapeutic Procedures Time Entry  Therapeutic Exercise Time Entry: 45       EDUCATION: Home exercise program, plan of care, activity modifications, pain management, and injury pathology     Code: [id number]     Medical History Form: Reviewed (scanned into chart)  Reviewed medical form, [place], [place] risk, Islam beliefs, PHQ     Screening  Frequency  Date Last Completed   Spiritual and Cultural Beliefs   Screening each visit or episode of care 4/1/2025   Falls Risk  Screening every ambulatory visit    Pain Screening annually at primary care visit  11/22/2024   Domestic Violence screening annually at primary care visit 7/17/2024   Depression Screening annually in the primary care setting 6/10/2024   Suicide Risk Screening annually in the primary care setting    Nutrition and Food Insecurity   Screening at least annually at primary care visit  6/10/2024   Key Learner annually in the primary care setting 4/1/2025   Drug Screen  4/21/2025 11:41 AM   Alcohol Screen  4/21/2025 11:41 AM   Advance Directive  7/17/2024     Time Calculation  Start Time: 1130  Stop Time: 1215  Time Calculation (min): 45 min    PT Therapeutic Procedures Time Entry  Therapeutic Exercise Time Entry: 45          Assessment: Patient presents with signs and symptoms consistent with Lumbar stenosis with DDD and Radicular pain, complex eval secondary to co morbidities, resulting in limited participation in pain-free ADLs and inability to perform at their prior level of function. Pt responded well therapy treatment. Pt fatigue with upper exercises and need to take longer rest time. When performing palloff, Pt need some cueing to correct form to maintain correct standing posture due to she has a narrow stance. Pt complaint about her lower back. we perform stretching to relieve pain. The pain was decrease after stretching treatment. Pt has no complaint after therapy session.   Complexity:high  Prognosis: fair  Response to care: fair  Clinical Presentation: Stable and/or uncomplicated characteristics  Personal Factors: Comorbidities    Problem List:  Pain  Function  Mobility  Strength  Plan:     Planned Interventions include: therapeutic exercise, self-care home management, manual therapy, therapeutic activities, gait training, neuromuscular coordination, vasopneumatic, dry needling, aquatic therapy    Next Treatment: Focus on Upper and Lower strengthening, ROM, and functional endurance  Frequency: 1-2 x  Week  Duration: 8 Weeks  Goals: Set and discussed today  4 weeks  Patient to have pain less than 5/10 for improved sit to stand ability- MET  Patient to be independent with HEP and progression for improved carryover- MET  Improved ability to perform walking 5 min with single point cane for improved mobility- PROGRESS  Improved tolerance with rere knee ext to 3+/5 for improved weightbearing stability and strength- PROGRESS    8 weeks  Patient to have improved JAISON score by 15 points for improved function at home- PROGRESS  Patient to have pain less than 4/10 for improved ADL performance up to 20 min- PROGRESS  Improved Walking 10 min for improved functional independence for better function with daily activities- PROGRESS  Patient to perform sit to stand 5 x in 30 seconds for improved lower extremity strength and transfers for motor chair-PROGRESS  Plan of care was developed with input and agreement by the patient      GO MACHUCA

## 2025-06-03 DIAGNOSIS — L20.89 FLEXURAL ATOPIC DERMATITIS: ICD-10-CM

## 2025-06-09 RX ORDER — CLOBETASOL PROPIONATE 0.5 MG/G
OINTMENT TOPICAL
Qty: 30 G | Refills: 3 | Status: SHIPPED | OUTPATIENT
Start: 2025-06-09

## 2025-06-17 ENCOUNTER — ANCILLARY PROCEDURE (OUTPATIENT)
Facility: CLINIC | Age: 60
End: 2025-06-17
Payer: MEDICARE

## 2025-06-17 DIAGNOSIS — R07.89 ATYPICAL CHEST PAIN: ICD-10-CM

## 2025-06-17 DIAGNOSIS — I47.10 PAROXYSMAL SVT (SUPRAVENTRICULAR TACHYCARDIA): ICD-10-CM

## 2025-06-17 DIAGNOSIS — R79.89 TROPONIN LEVEL ELEVATED: ICD-10-CM

## 2025-06-17 DIAGNOSIS — R06.02 SHORTNESS OF BREATH ON EXERTION: ICD-10-CM

## 2025-06-17 DIAGNOSIS — R55 SYNCOPE AND COLLAPSE: ICD-10-CM

## 2025-06-17 DIAGNOSIS — I10 BENIGN ESSENTIAL HTN: ICD-10-CM

## 2025-06-17 DIAGNOSIS — R00.2 HEART PALPITATIONS: ICD-10-CM

## 2025-06-17 LAB
AORTIC VALVE PEAK VELOCITY: 1.06 M/S
AV PEAK GRADIENT: 5 MMHG
AVA (PEAK VEL): 2.88 CM2
EJECTION FRACTION APICAL 4 CHAMBER: 70.5
EJECTION FRACTION: 70 %
LEFT ATRIUM VOLUME AREA LENGTH INDEX BSA: 19.8 ML/M2
LEFT VENTRICLE INTERNAL DIMENSION DIASTOLE: 4.56 CM (ref 3.5–6)
LEFT VENTRICULAR OUTFLOW TRACT DIAMETER: 2.13 CM
MITRAL VALVE E/A RATIO: 0.75
RIGHT VENTRICLE FREE WALL PEAK S': 8 CM/S
RIGHT VENTRICLE PEAK SYSTOLIC PRESSURE: 26 MMHG
TRICUSPID ANNULAR PLANE SYSTOLIC EXCURSION: 1.5 CM

## 2025-06-17 PROCEDURE — 93306 TTE W/DOPPLER COMPLETE: CPT

## 2025-06-17 PROCEDURE — 93306 TTE W/DOPPLER COMPLETE: CPT | Performed by: STUDENT IN AN ORGANIZED HEALTH CARE EDUCATION/TRAINING PROGRAM

## 2025-06-27 ENCOUNTER — TREATMENT (OUTPATIENT)
Dept: PHYSICAL THERAPY | Facility: CLINIC | Age: 60
End: 2025-06-27
Payer: MEDICARE

## 2025-06-27 DIAGNOSIS — M51.362 DEGENERATION OF INTERVERTEBRAL DISC OF LUMBAR REGION WITH DISCOGENIC BACK PAIN AND LOWER EXTREMITY PAIN: ICD-10-CM

## 2025-06-27 DIAGNOSIS — G35 MULTIPLE SCLEROSIS (MULTI): ICD-10-CM

## 2025-06-27 DIAGNOSIS — M54.16 LUMBAR RADICULOPATHY, ACUTE: ICD-10-CM

## 2025-06-27 DIAGNOSIS — M48.062 SPINAL STENOSIS OF LUMBAR REGION WITH NEUROGENIC CLAUDICATION: ICD-10-CM

## 2025-06-27 NOTE — PROGRESS NOTES
Physical Therapy  Physical Therapy Orthopedic Evaluation    Patient Name: Genny Grant  MRN: [id number]  Today's Date: 6/27/2025  Time Calculation  Start Time: 1235  Stop Time: 1315  Time Calculation (min): 40 min       PT Therapeutic Procedures Time Entry  Therapeutic Exercise Time Entry: 40       Insurance:  Visit number: 7  Authorization info: 6 VISITS, 4/1/25-5/30/25  Insurance Type: Payor: [employer] MEDICARE / Plan: ANTH DUAL ADVANTAGE / Product Type: *No Product type* /      Current Problem     Problem List Items Addressed This Visit           ICD-10-CM       Neuro    Multiple sclerosis (Multi) G35    Lumbar radiculopathy, acute M54.16    Spinal stenosis of lumbar region with neurogenic claudication M48.062    Degeneration of intervertebral disc of lumbar region with discogenic back pain and lower extremity pain M51.362         Surgery:    Referred by:   Homar Wheeler MD     Precautions:   MS, fibromyalgia, osteoporosis, RA, former CA, HBP, High cholesterol, heart palpitations, DWIGHT, neuropathy    Subjective:   Subjective - Pt feel that she has more energy that more last session.      General:     Chief Complaint: Low back and leg pain  Onset: chronic   VARSHA: gradual worsening    Pain:     Location: Low back 4/10 this AM    Relevant Information (PMH & Previous Tests/Imaging): MRI 2024 Lumbar spondylosis as described worst at L3-L4 with moderate canal  narrowing.  Small amount of edema in the left L4 and L5 pedicles, likely mechanical stress.     Prior Level of Function (PLOF)  Patient previously independent with all ADLs  Exercise/Physical Activity: walking and dancing  Work/School: Retired teacher     Patient's Goal(s) for Therapy: improve walking better with longer distance    Red Flags: Do you have any of the following? No  Fever/chills, unexplained weight changes, dizziness/fainting, unexplained change in bowel or bladder functions, unexplained malaise or muscle weakness, night pain/sweats,  numbness or tingling    Objective:  Objective     Palpation/Observation  TTP through lower R >L paraspinals  Posture  Presents in motor chair, no accentuated curvatures, rigid standing posture with single point cane   Special Testing  + SLR, asis compression and gap testing, sacral rock  ROM  6/27/2025  Trunk AROM  Flexion 25%  Ext 25%  L rot 25-50%  R rot 25%   All are painful  HS pop angle 40deg abdifatah  Hip flexion 100deg abdifatah  Abdifatah hip rotation IR and ER limited by 10deg, log roll limited by 10 deg abdifatah  No restrictions with hip ABD/ADD  Central PA glides show restriction withLower lumbar mobility and pain is present    Strength  6/27/2025  Abdifatah Knee ext and flex 3/5 unable to tolerate resistance, abdifatah hip flex 3+/5, she cannot perform standing heel or calf raise, 3/5 seated heel and calf raise  Sensation  L>R radicular pain  Reflexes  1+ achilles patellar DTR, neg clonus    Transfers: slow, abdifatah UE assist, cane assist, mild guarding  Gait: uses motor chair, reports she ambulates at home with Single point cane for short distances  SL Balance:   DL Squat: sit to stand antalgic with UE assist, and decreased Lower extremity push, more of a pull/push with arms to stand  SL Squat:      Treatments:  Ther-EX:  Weight supine marches 2 x 10   Seated ball roll out 3 way 5 x   Bridges 3 x 10  Trunk rotation 2 x 20  Clam shell 2 x 10  Seated Pallof 2 x 10 with YB  Seated pallof [place] x 10 each side    Shoulder retraction x 10      Manual:    Modalities:  Discussed ice and heat       PT Therapeutic Procedures Time Entry  Therapeutic Exercise Time Entry: 40       EDUCATION: Home exercise program, plan of care, activity modifications, pain management, and injury pathology     Code: [id number]     Medical History Form: Reviewed (scanned into chart)  Reviewed medical form, [place], Falls risk, Jew beliefs, PHQ     Screening  Frequency  Date Last Completed   Spiritual and Cultural Beliefs   Screening each visit or episode of care  4/1/2025   Falls Risk Screening every ambulatory visit    Pain Screening annually at primary care visit  11/22/2024   Domestic Violence screening annually at primary care visit 7/17/2024   Depression Screening annually in the primary care setting 6/10/2024   Suicide Risk Screening annually in the primary care setting    Nutrition and Food Insecurity   Screening at least annually at primary care visit  6/10/2024   Key Learner annually in the primary care setting 4/1/2025   Drug Screen  4/21/2025 11:41 AM   Alcohol Screen  4/21/2025 11:41 AM   Advance Directive  7/17/2024     Time Calculation  Start Time: 1235  Stop Time: 1315  Time Calculation (min): 40 min    PT Therapeutic Procedures Time Entry  Therapeutic Exercise Time Entry: 40          Assessment: Patient presents with signs and symptoms consistent with Lumbar stenosis with DDD and Radicular pain, complex eval secondary to co morbidities, resulting in limited participation in pain-free ADLs and inability to perform at their prior level of function. Patient responded well to today’s therapy session. She experienced fatigue during lower extremity and core strengthening exercises, requiring extended rest periods. While performing seated pallof presses, she needed cueing to maintain proper form due to fatigue. The patient reported pain in the lower right leg, for which stretching interventions were performed. She noted decreased pain following the stretching. No additional complaints were reported after the session.  Complexity:high  Prognosis: fair  Response to care: fair  Clinical Presentation: Stable and/or uncomplicated characteristics  Personal Factors: Comorbidities    Problem List:  Pain  Function  Mobility  Strength  Plan:     Planned Interventions include: therapeutic exercise, self-care home management, manual therapy, therapeutic activities, gait training, neuromuscular coordination, vasopneumatic, dry needling, aquatic therapy    Next Treatment: Focus  on Upper, Lower, Core strengthening, ROM, and functional endurance  Frequency: 1-2 x Week  Duration: 8 Weeks  Goals: Set and discussed today  4 weeks  Patient to have pain less than 5/10 for improved sit to stand ability- MET  Patient to be independent with HEP and progression for improved carryover- MET  Improved ability to perform walking 5 min with single point cane for improved mobility- PROGRESS  Improved tolerance with rere knee ext to 3+/5 for improved weightbearing stability and strength- PROGRESS    8 weeks  Patient to have improved JAISON score by 15 points for improved function at home- PROGRESS  Patient to have pain less than 4/10 for improved ADL performance up to 20 min- PROGRESS  Improved Walking 10 min for improved functional independence for better function with daily activities- PROGRESS  Patient to perform sit to stand 5 x in 30 seconds for improved lower extremity strength and transfers for motor chair-PROGRESS  Plan of care was developed with input and agreement by the patient      GO MACHUCA

## 2025-07-03 ENCOUNTER — APPOINTMENT (OUTPATIENT)
Dept: PHYSICAL THERAPY | Facility: CLINIC | Age: 60
End: 2025-07-03
Payer: MEDICARE

## 2025-07-11 ENCOUNTER — CLINICAL SUPPORT (OUTPATIENT)
Dept: PHYSICAL THERAPY | Facility: CLINIC | Age: 60
End: 2025-07-11
Payer: MEDICARE

## 2025-07-11 DIAGNOSIS — M51.362 DEGENERATION OF INTERVERTEBRAL DISC OF LUMBAR REGION WITH DISCOGENIC BACK PAIN AND LOWER EXTREMITY PAIN: ICD-10-CM

## 2025-07-11 DIAGNOSIS — M48.062 SPINAL STENOSIS OF LUMBAR REGION WITH NEUROGENIC CLAUDICATION: ICD-10-CM

## 2025-07-11 DIAGNOSIS — M54.16 LUMBAR RADICULOPATHY, ACUTE: ICD-10-CM

## 2025-07-11 DIAGNOSIS — G35 MULTIPLE SCLEROSIS (MULTI): ICD-10-CM

## 2025-07-11 PROCEDURE — 97110 THERAPEUTIC EXERCISES: CPT | Mod: GP | Performed by: PHYSICAL THERAPIST

## 2025-07-11 NOTE — PROGRESS NOTES
Physical Therapy  Physical Therapy Orthopedic Evaluation    Patient Name: Genny Grant  MRN: [id number]  Today's Date: 7/11/2025  Time Calculation  Start Time: 1230  Stop Time: 1315  Time Calculation (min): 45 min       PT Therapeutic Procedures Time Entry  Therapeutic Exercise Time Entry: 45       Insurance:  Visit number: 2  Authorization info: 4 visits  Insurance Type: Payor: [facility] MEDICARE / Plan: ANTH DUAL ADVANTAGE / Product Type: *No Product type* /      Current Problem     Problem List Items Addressed This Visit           ICD-10-CM    Multiple sclerosis (Multi) G35    Lumbar radiculopathy, acute M54.16    Spinal stenosis of lumbar region with neurogenic claudication M48.062    Degeneration of intervertebral disc of lumbar region with discogenic back pain and lower extremity pain M51.362         Surgery:    Referred by:   Homar Wheeler MD     Precautions:   MS, fibromyalgia, osteoporosis, RA, former CA, HBP, High cholesterol, heart palpitations, DWIGHT, neuropathy    Subjective:   Subjective - Feel lack of energy today and pain both legs, had to use my power chair today.     General:     Chief Complaint: Low back and leg pain  Onset: chronic   VARSHA: gradual worsening    Pain:     Location: Low back 4/10 this AM    Relevant Information (PMH & Previous Tests/Imaging): MRI 2024 Lumbar spondylosis as described worst at L3-L4 with moderate canal  narrowing.  Small amount of edema in the left L4 and L5 pedicles, likely mechanical stress.     Prior Level of Function (PLOF)  Patient previously independent with all ADLs  Exercise/Physical Activity: walking and dancing  Work/School: Retired teacher     Patient's Goal(s) for Therapy: improve walking better with longer distance    Red Flags: Do you have any of the following? No  Fever/chills, unexplained weight changes, dizziness/fainting, unexplained change in bowel or bladder functions, unexplained malaise or muscle weakness, night pain/sweats, numbness or  tingling    Objective:  Objective     Palpation/Observation  TTP through lower R >L paraspinals  Posture  Presents in motor chair, no accentuated curvatures, rigid standing posture with single point cane   Special Testing  + SLR, asis compression and gap testing, sacral rock  ROM  7/11/2025  Trunk AROM  Flexion 25%  Ext 25%  L rot 25-50%  R rot 25%   All are painful  HS pop angle 40deg abdifatah  Hip flexion 100deg abdifatah  Abdifatah hip rotation IR and ER limited by 10deg, log roll limited by 10 deg abdifatah  No restrictions with hip ABD/ADD  Central PA glides show restriction withLower lumbar mobility and pain is present    Strength  7/11/2025  Abdifatah Knee ext and flex 3/5 unable to tolerate resistance, abdifatah hip flex 3+/5, she cannot perform standing heel or calf raise, 3/5 seated heel and calf raise  Sensation  L>R radicular pain  Reflexes  1+ achilles patellar DTR, neg clonus    Transfers: slow, abdifatah UE assist, cane assist, mild guarding  Gait: uses motor chair today, she has been suing a cane in the clinic previously   DL Squat: sit to stand antalgic with UE assist, and decreased Lower extremity push, more of a pull/push with arms to stand      Treatments:  Ther-EX:  supine marches 2 x 10   Bridges 3 x 10  Trunk rotation 2 x 20  Clam shell 2 x 10  Seated Pallof 2 x 10 with YB  Seated pallof [place] x 10 each side    Shoulder flexion YTB x 10      Manual:    Modalities:  Discussed ice and heat       PT Therapeutic Procedures Time Entry  Therapeutic Exercise Time Entry: 45       EDUCATION: Home exercise program, plan of care, activity modifications, pain management, and injury pathology     Code: [id number]     Medical History Form: Reviewed (scanned into chart)  Reviewed medical form, [place], Falls risk, Worship beliefs, PHQ  Annually for patients age 55 and older and patients with life-threatening illness and more frequently at the discretion of the provider when there has been a change in patient condition/clinical indication.    Screening  Date Last Completed   Advance Directives 7/17/2024     Annually during a primary care office visit and at the discretion of the provider when there has been a change in patient condition/clinical indication.  Depression Screening 6/10/2024   Suicide Risk Screening      Annually during any provider office visit and at the discretion of the provider when there has been a change in patient condition/clinical indication.  Alcohol Screening 4/21/2025 11:41 AM   Substance Use Screening 4/21/2025 11:41 AM    Tobacco 4/21/2025  1:13 PM     Completed at the discretion of the provider during any provider office visit or when there has been a change in patient condition/clinical indication.  Domestic Violence 7/17/2024   Human Trafficking    Spiritual/Cultural  4/1/2025   Food Insecurity Screening Social Determinants of Health  6/10/2024   Patient Education/Key Learner 4/1/2025   Pain Screening 11/22/2024     Completed per Adult Falls Prevention (Outpatient), CP-119  Falls Risk Screening            Time Calculation  Start Time: 1230  Stop Time: 1315  Time Calculation (min): 45 min    PT Therapeutic Procedures Time Entry  Therapeutic Exercise Time Entry: 45          Assessment: Patient presents with signs and symptoms consistent with Lumbar stenosis with DDD and Radicular pain, complex patient secondary to co morbidities, resulting in limited participation in pain-free ADLs and inability to perform at their prior level of function. Patient has  increased lower extremity pain, which has led to increased reliance on her power chair for mobility. During session, she demonstrated fatigue with lower extremity and core strengthening exercises, requiring intermittent rest breaks. Despite this, she was able to participate in all activities with appropriate effort. Due to patient and clinic schedule patient was only able to complete 2/4 sessions, would benefit from date extension or further visits to address deficits and  limitations listed     Complexity: high  Prognosis: fair  Response to care: fair  Clinical Presentation: Stable and/or uncomplicated characteristics  Personal Factors: Comorbidities    Problem List:  Pain  Function  Mobility  Strength  Plan:     Planned Interventions include: therapeutic exercise, self-care home management, manual therapy, therapeutic activities, gait training, neuromuscular coordination, vasopneumatic, dry needling, aquatic therapy    Next Treatment: Focus on Upper, Lower, Core strengthening, ROM, and functional endurance  Frequency: 1-2 x Week  Duration: 4 more week  Goals: Set and discussed today  4 weeks  Patient to have pain less than 5/10 for improved sit to stand ability- MET  Patient to be independent with HEP and progression for improved carryover- MET  Improved ability to perform walking 5 min with single point cane for improved mobility- PROGRESS  Improved tolerance with rere knee ext to 3+/5 for improved weightbearing stability and strength- PROGRESS    8 weeks  Patient to have improved JAISON score by 15 points for improved function at home- PROGRESS  Patient to have pain less than 4/10 for improved ADL performance up to 20 min- PROGRESS  Improved Walking 10 min for improved functional independence for better function with daily activities- PROGRESS  Patient to perform sit to stand 5 x in 30 seconds for improved lower extremity strength and transfers for motor chair-PROGRESS  Plan of care was developed with input and agreement by the patient  Insurance Authorization Information  Date of Evaluation: 25    Onset Date: 3/6/2025    Referring Physician: Homar Wheeler     Surgery in the Last 3 months:  no    CPT Codes: Therapeutic Exercise: 27509 Therapeutic Activity: 05806 Neuromuscular Re-Education: 14816 Manual Therapy: 33417 Gait Trainin Self-Care/Home Management Trainin Mechanical Traction: 53294 Electric Stimulation (Attended): 92000 Electric Stimulation (Unattended):  66933 Vasopneumatic Device: 38473 PT Re-Evaluation: 47765     Diagnosis:   Problem List Items Addressed This Visit           ICD-10-CM    Multiple sclerosis (Multi) G35    Lumbar radiculopathy, acute M54.16    Spinal stenosis of lumbar region with neurogenic claudication M48.062    Degeneration of intervertebral disc of lumbar region with discogenic back pain and lower extremity pain M51.362          Functional Outcome:       OT / PT Evaluation complexity:  high    Which of the following best describes the primary reason of therapy: Improving, restoring, or adapting functional mobility or skills    Visits Requested: 6    Date Range: 90 days    Select all conditions that apply: Arthritis Conditions     Re-evaluation only:  Short term goals Met: 2/4    Long term goals Met: 1/4    Richie Moreno, PT         Richie Moreno, PT

## 2025-07-17 ENCOUNTER — OFFICE VISIT (OUTPATIENT)
Dept: CARDIOLOGY | Facility: CLINIC | Age: 60
End: 2025-07-17
Payer: MEDICARE

## 2025-07-17 VITALS
WEIGHT: 155 LBS | DIASTOLIC BLOOD PRESSURE: 62 MMHG | SYSTOLIC BLOOD PRESSURE: 94 MMHG | HEART RATE: 64 BPM | OXYGEN SATURATION: 97 % | BODY MASS INDEX: 25.79 KG/M2

## 2025-07-17 DIAGNOSIS — R07.89 ATYPICAL CHEST PAIN: Primary | ICD-10-CM

## 2025-07-17 DIAGNOSIS — R06.02 SHORTNESS OF BREATH ON EXERTION: ICD-10-CM

## 2025-07-17 DIAGNOSIS — I10 BENIGN ESSENTIAL HTN: ICD-10-CM

## 2025-07-17 DIAGNOSIS — R79.89 TROPONIN LEVEL ELEVATED: ICD-10-CM

## 2025-07-17 DIAGNOSIS — G47.33 OSA (OBSTRUCTIVE SLEEP APNEA): ICD-10-CM

## 2025-07-17 DIAGNOSIS — R55 SYNCOPE AND COLLAPSE: ICD-10-CM

## 2025-07-17 DIAGNOSIS — I27.20 PULMONARY HYPERTENSION, UNSPECIFIED (MULTI): ICD-10-CM

## 2025-07-17 DIAGNOSIS — R07.9 CHEST PAIN, UNSPECIFIED TYPE: ICD-10-CM

## 2025-07-17 DIAGNOSIS — I47.10 PAROXYSMAL SVT (SUPRAVENTRICULAR TACHYCARDIA): ICD-10-CM

## 2025-07-17 PROCEDURE — 99215 OFFICE O/P EST HI 40 MIN: CPT | Performed by: STUDENT IN AN ORGANIZED HEALTH CARE EDUCATION/TRAINING PROGRAM

## 2025-07-17 PROCEDURE — 3074F SYST BP LT 130 MM HG: CPT | Performed by: STUDENT IN AN ORGANIZED HEALTH CARE EDUCATION/TRAINING PROGRAM

## 2025-07-17 PROCEDURE — G2211 COMPLEX E/M VISIT ADD ON: HCPCS | Performed by: STUDENT IN AN ORGANIZED HEALTH CARE EDUCATION/TRAINING PROGRAM

## 2025-07-17 PROCEDURE — 99212 OFFICE O/P EST SF 10 MIN: CPT

## 2025-07-17 PROCEDURE — 3078F DIAST BP <80 MM HG: CPT | Performed by: STUDENT IN AN ORGANIZED HEALTH CARE EDUCATION/TRAINING PROGRAM

## 2025-07-17 ASSESSMENT — PAIN SCALES - GENERAL: PAINLEVEL_OUTOF10: 0-NO PAIN

## 2025-07-17 ASSESSMENT — ENCOUNTER SYMPTOMS
LOSS OF SENSATION IN FEET: 0
DEPRESSION: 0
OCCASIONAL FEELINGS OF UNSTEADINESS: 1

## 2025-07-17 NOTE — PATIENT INSTRUCTIONS
Please continue remaining cardiac medications including aspirin 81 mg, atorvastatin 10 mg daily, chlorthalidone 25 mg daily, valsartan 40 mg twice daily.    I have also placed a referral to sleep medicine as well as a home sleep study.    Please followup with me in Cardiology clinic within the next 9 months.  Please return to clinic sooner or seek emergent care if your symptoms reoccur or worsen.

## 2025-07-17 NOTE — PROGRESS NOTES
Follow up    HPI:    Genny Grant is a 60 y.o. female with pertinent history of family history of premature coronary artery disease, paroxysmal supraventricular tachycardia, palpitations, hypertension, dyslipidemia, multiple sclerosis, breast cancer with recent chemotherapy and upcoming radiation, history of DWIGHT,  history of syncopal episode, low normal ejection fraction with impaired relaxation on echo performed 9/17/2021, no clear ischemia on nuclear stress test performed 8/15/2017 or 1/31/2020, coronary calcium score of 0.4 9/23/2021, preserved ejection fraction with impaired relaxation echo performed 10/19/2022, no clear ischemia nuclear stress test performed 3/15/2023, CT calcium score of 0 performed 10/12/2023, preserved LVEF of 70% with grade 1 diastolic dysfunction, low normal right ventricular function, slightly elevated RVSP of 31 mmHg on echocardiogram performed 6/19/2025 presents for follow-up.    She is doing relatively well.  She has had minimal dizziness.  We did review discuss her recent echocardiogram as well as natural history of pulmonary hypertension.  She does note that in the past she had untreated sleep apnea and could not tolerate CPAP.  Blood pressure is well-controlled.  She continues to have rare right sided sporadic nonexertional chest discomfort.  No exacerbating or relieving factors.  Pt denies orthopnea, and paroxysmal nocturnal dyspnea.  Pt denies worsening lower extremity edema.  Pt denies palpitations or syncope.  No recent falls.  No fever or chills.  No cough.  No change in bowel or bladder habits.  No sick contacts.  No recent travel.    12 point review of systems including (Constitutional, Eyes, ENMT, Respiratory, Cardiac, Gastrointestinal, Neurological, Psychiatric, and Hematologic) was performed and is otherwise negative.    Past medical history reviewed:   has a past medical history of Hypersomnia, unspecified (09/10/2019), Multiple sclerosis (Multi), Personal  history of other endocrine, nutritional and metabolic disease, and Personal history of other specified conditions (01/04/2018).    Past surgical history reviewed    Social history reviewed:   reports that she has never smoked. She has never used smokeless tobacco. She reports that she does not drink alcohol and does not use drugs.     Family history reviewed:    Family History   Problem Relation Name Age of Onset    Alzheimer's disease Mother      Other (Malignant neoplasm) Mother      Sleep apnea Mother      Other (Malignant neoplasm) Father         Allergies reviewed: Adhesive, Diphenhydramine, Gadolinium-containing contrast media, Penicillins, and Pollen extracts     Medications reviewed:   Current Outpatient Medications   Medication Instructions    aspirin 81 mg    atorvastatin (LIPITOR) 10 mg    baclofen (LIORESAL) 20 mg, oral, 3 times daily    betamethasone dipropionate (Diprosone) 0.05 % lotion APPLY TOPICALLY TWICE DAILY TO ITCHY AREAS OF SCALP FOR 2 MONTHS THEN DECREASE TO MONDAY, WEDNESDAY, FRIDAY FOR MAINTENANCE    bethanechol (URECHOLINE) 50 mg    bimatoprost (Latisse) 0.03 % ophthalmic solution Apply daily to areas of decreased hair on eyebrows    capsicum (Zostrix) 0.075 % topical cream 1 Application    chlorthalidone (HYGROTON) 25 mg, oral, Daily    ciclopirox (Penlac) 8 % solution APPLY TO AFFECTED AREA DAILY AT BEDTIME. REMOVE FILM FROM MEDICATION ONCE WEEKLY WITH ALCOHOL SWAB    clindamycin (Cleocin T) 1 % lotion 1 Application, 2 times daily    clobetasol (Temovate) 0.05 % ointment APPLY TO AFFECTED AREA(S) OF ECZEMA ON HANDS TWICE DAILY WHEN ACTIVE AS NEEDED USE LESS THAN 14 DAYS PER MONTH    cycloSPORINE (Restasis MultiDose) 0.05 % drops 1 drop, 2 times daily    diclofenac (Voltaren) 0.1 % ophthalmic solution INSTILL 1 DROP IN EACH EYE ONCE DAILY    DULoxetine (CYMBALTA) 60 mg    ergocalciferol (Vitamin D-2) 1.25 MG (65168 UT) capsule 1 capsule, Once Weekly    fluticasone (Flonase) 50  mcg/actuation nasal spray 2 sprays, Each Nostril, Daily    furosemide (Lasix) 40 mg tablet 1 tablet, Daily RT    gabapentin (Neurontin) 100 mg capsule 1 capsule, 3 times daily    heparin flush (heparin LockFlush,Porcine,,PF,) 10 unit/mL injection 5 mL, Daily    hydrocortisone 2.5 % cream APPLY TOPICALLY TWICE DAILY TO DRY PATCHES ON FACE FOR UP TO 2 WEEKS AT A TIME. DO NOT USE MORE THAN 14 DAYS PER MONTH    hydrOXYzine HCL (Atarax) 25 mg tablet Take by mouth.    ibandronate (BONIVA) 150 mg, Every 30 days    ibuprofen 600 mg tablet Take by mouth.    ketoconazole (NIZOral) 2 % shampoo MASSAGE INTO SCALP AND LET SIT 5 TO 10 MINUTES BEFORE RINSING ONCE A WEEK    lidocaine (Lidoderm) 5 % patch APPLY 1 PATCH TO INTACT SKIN REMOVE AFTER 12 HOURS EXTERNALLY ONCE A DAY FOR 30 DAYS    loratadine (CLARITIN) 10 mg, Daily    multivitamin tablet 1 tablet, Daily    pantoprazole (PROTONIX) 40 mg    potassium chloride CR (Klor-Con M20) 20 mEq ER tablet 1 tablet, Daily    propranolol XL (Innopran XL) 80 mg 24 hr capsule 1 capsule, Daily    Saline NasaL 0.65 % nasal spray PLACE 2 DROPS IN BOTH NOSTRILS EVERY 2 HOURS AS NEEDED    simethicone (Mylicon) 125 mg chewable tablet 1 tablet, Every 6 hours PRN    sodium chloride 0.9% (sodium chloride) flush 10 mL, Daily    sucralfate (Carafate) 1 gram tablet TAKE 1 TABLET BY MOUTH TWICE A DAY ON AN EMPTY STOMACH    Tecfidera 240 mg capsule,delayed release(DR/EC) capsule oral, 2 times daily    tiZANidine (ZANAFLEX) 4 mg, oral, Every 8 hours PRN    traMADol (ULTRAM) 50 mg, Every 8 hours PRN    traZODone (DESYREL) 100 mg, oral, Nightly    valsartan (Diovan) 40 mg tablet 1 tablet, 2 times daily         Visit Vitals  BP 94/62 (BP Location: Right arm, Patient Position: Sitting, BP Cuff Size: Small adult)   Pulse 64   Wt 70.3 kg (155 lb)   SpO2 97%   BMI 25.79 kg/m²   Smoking Status Never   BSA 1.8 m²         Physical Exam:   General:  Patient is awake, alert, and oriented.  Patient is in no acute  distress.  HEENT:  Pupils equal and reactive.  Normocephalic.  Moist mucosa.    Neck:  No thyromegaly.  Normal Jugular Venous Pressure.  Cardiovascular:  Regular rate and rhythm.  Normal S1 and S2.  1/6 SCARLET.  Pulmonary:  Clear to auscultation bilaterally.  Abdomen:  Soft. Non-tender.   Non-distended.  Positive bowel sounds.  Lower Extremities:  2+ pedal pulses. No LE edema.  Neurologic:  Cranial nerves intact.  No focal deficit.   Skin: Skin warm and dry, normal skin turgor.   Psychiatric: Normal affect.    Last Labs:  CBC -      Lab Results   Component Value Date    WBC 6.3 11/18/2021    HGB 13.8 11/18/2021    HCT 41.1 11/18/2021     11/18/2021        CMP-  Lab Results   Component Value Date    GLUCOSE 101 (H) 06/22/2022     06/22/2022    K 3.7 06/22/2022    CL 99 06/22/2022    CO2 32 06/22/2022    ANIONGAP 11 06/22/2022    BUN 16 06/22/2022    CREATININE 0.69 06/22/2022    CALCIUM 9.8 06/22/2022    PROT 7.3 06/22/2022    ALBUMIN 4.2 06/22/2022    AST 13 06/22/2022    ALT 18 06/22/2022    ALKPHOS 83 06/22/2022    BILITOT 1.3 (H) 06/22/2022        LIPIDS-  Lab Results   Component Value Date    CHOL 143 11/18/2021    TRIG 75 11/18/2021    HDL 45.1 11/18/2021    CHHDL 3.2 11/18/2021    VLDL 15 11/18/2021        OTHERS-  Lab Results   Component Value Date    HGBA1C 6.1 (H) 05/26/2022        I personally reviewed the patient's recent vitals, labs, medications, orders, EKGs, pertinent cardiac imaging/ echocardiography and ischemic evaluations including stress testing/ cardiac catheterization.    Assessment and Plan:    Genny Grant is a 60 y.o. female with pertinent history of family history of premature coronary artery disease, paroxysmal supraventricular tachycardia, palpitations, hypertension, dyslipidemia, multiple sclerosis, breast cancer with recent chemotherapy and upcoming radiation, history of DWIGHT,  history of syncopal episode, low normal ejection fraction with impaired relaxation on echo  performed 9/17/2021, no clear ischemia on nuclear stress test performed 8/15/2017 or 1/31/2020, coronary calcium score of 0.4 9/23/2021, preserved ejection fraction with impaired relaxation echo performed 10/19/2022, no clear ischemia nuclear stress test performed 3/15/2023, CT calcium score of 0 performed 10/12/2023, preserved LVEF of 70% with grade 1 diastolic dysfunction, low normal right ventricular function, slightly elevated RVSP of 31 mmHg on echocardiogram performed 6/19/2025 presents for follow-up.  She is doing relatively well.  She has had minimal dizziness.  We did review discuss her recent echocardiogram as well as natural history of pulmonary hypertension.  She does note that in the past she had untreated sleep apnea and could not tolerate CPAP.  Blood pressure is well-controlled.  She continues to have rare right sided sporadic nonexertional chest discomfort.      Please continue remaining cardiac medications including aspirin 81 mg, atorvastatin 10 mg daily, chlorthalidone 25 mg daily, valsartan 40 mg twice daily.    I have also placed a referral to sleep medicine as well as a home sleep study.    Please followup with me in Cardiology clinic within the next 9 months.  Please return to clinic sooner or seek emergent care if your symptoms reoccur or worsen.    Thank you for allowing me to participate in their care.  Please feel free to call me with any further questions or concerns.    Amrit Campbell MD, FACC, [facility]  Division of Cardiovascular Medicine  System Director, Nuclear Cardiology   Medical Director, [facility] [place], [facility]   Clinical , [facility] [employer]  [email or web address]   Office:  [phone]

## 2025-07-18 ENCOUNTER — TREATMENT (OUTPATIENT)
Dept: PHYSICAL THERAPY | Facility: CLINIC | Age: 60
End: 2025-07-18
Payer: MEDICARE

## 2025-07-18 DIAGNOSIS — M51.362 DEGENERATION OF INTERVERTEBRAL DISC OF LUMBAR REGION WITH DISCOGENIC BACK PAIN AND LOWER EXTREMITY PAIN: ICD-10-CM

## 2025-07-18 DIAGNOSIS — M54.16 LUMBAR RADICULOPATHY, ACUTE: ICD-10-CM

## 2025-07-18 DIAGNOSIS — M48.062 SPINAL STENOSIS OF LUMBAR REGION WITH NEUROGENIC CLAUDICATION: ICD-10-CM

## 2025-07-18 DIAGNOSIS — G35 MULTIPLE SCLEROSIS (MULTI): ICD-10-CM

## 2025-07-18 PROCEDURE — 97110 THERAPEUTIC EXERCISES: CPT | Mod: GP | Performed by: PHYSICAL THERAPIST

## 2025-07-18 PROCEDURE — 97140 MANUAL THERAPY 1/> REGIONS: CPT | Mod: GP | Performed by: PHYSICAL THERAPIST

## 2025-07-18 NOTE — PROGRESS NOTES
Physical Therapy  Physical Therapy Orthopedic Evaluation    Patient Name: Genny Grant  MRN: [id number]  Today's Date: 7/18/2025  Time Calculation  Start Time: 1230  Stop Time: 1315  Time Calculation (min): 45 min       PT Therapeutic Procedures Time Entry  Therapeutic Exercise Time Entry: 30  Manual Therapy Time Entry: 15       Insurance:  Visit number: 2  Authorization info: 4 visits  Insurance Type: Payor: ANTH MEDICARE / Plan: ANTHEM DUAL ADVANTAGE / Product Type: *No Product type* /      Current Problem     Problem List Items Addressed This Visit           ICD-10-CM    Multiple sclerosis (Multi) G35    Lumbar radiculopathy, acute M54.16    Spinal stenosis of lumbar region with neurogenic claudication M48.062    Degeneration of intervertebral disc of lumbar region with discogenic back pain and lower extremity pain M51.362         Surgery:    Referred by:   Homar Wheeler MD     Precautions:   MS, fibromyalgia, osteoporosis, RA, former CA, HBP, High cholesterol, heart palpitations, DWIGHT, neuropathy    Subjective:   Subjective - Feel lack of energy today and pain both legs, had to use my power chair today.     General:     Chief Complaint: Low back and leg pain  Onset: chronic   VARSHA: gradual worsening    Pain:     Location: Low back 4/10 this AM  7/18/25 bilateral leg pain 5/10    Relevant Information (PMH & Previous Tests/Imaging): MRI 2024 Lumbar spondylosis as described worst at L3-L4 with moderate canal  narrowing.  Small amount of edema in the left L4 and L5 pedicles, likely mechanical stress.     Prior Level of Function (PLOF)  Patient previously independent with all ADLs  Exercise/Physical Activity: walking and dancing  Work/School: Retired teacher     Patient's Goal(s) for Therapy: improve walking better with longer distance    Red Flags: Do you have any of the following? No  Fever/chills, unexplained weight changes, dizziness/fainting, unexplained change in bowel or bladder functions,  unexplained malaise or muscle weakness, night pain/sweats, numbness or tingling    Objective:  Objective     Palpation/Observation  TTP through lower R >L paraspinals  Posture  Presents in motor chair, no accentuated curvatures, rigid standing posture with single point cane   Special Testing  + SLR, asis compression and gap testing, sacral rock  ROM  7/18/2025  Trunk AROM  Flexion 25%  Ext 25%  L rot 25-50%  R rot 25%   All are painful  HS pop angle 40deg abdifatah  Hip flexion 100deg abdifatah  Abdifatah hip rotation IR and ER limited by 10deg, log roll limited by 10 deg abdifatah  No restrictions with hip ABD/ADD  Central PA glides show restriction withLower lumbar mobility and pain is present    Strength  7/18/2025  Abdifatah Knee ext and flex 3/5 unable to tolerate resistance, abdifatah hip flex 3+/5, she cannot perform standing heel or calf raise, 3/5 seated heel and calf raise  Sensation  L>R radicular pain  Reflexes  1+ achilles patellar DTR, neg clonus    Transfers: slow, abdifatah UE assist, cane assist, mild guarding  Gait: uses motor chair today, she has been suing a cane in the clinic previously   DL Squat: sit to stand antalgic with UE assist, and decreased Lower extremity push, more of a pull/push with arms to stand      Treatments:  Ther-EX:  supine marches 2 x 10   Bridges 3 x 10  Trunk rotation 2 x 20  Clam shell 2 x 10  Seated Pallof 2 x 10 with YB  Seated pallof [place] x 10 each side    Shoulder flexion YTB x 10  Chest press with YTB 2 x 10       Manual:  ISTM massage stick on calf and hamstring   Modalities:  Discussed ice and heat       PT Therapeutic Procedures Time Entry  Therapeutic Exercise Time Entry: 30  Manual Therapy Time Entry: 15       EDUCATION: Home exercise program, plan of care, activity modifications, pain management, and injury pathology     Code: [id number]     Medical History Form: Reviewed (scanned into chart)  Reviewed medical form, [place], Falls risk, Tenriism beliefs, PHQ  Annually for patients age 55 and older  and patients with life-threatening illness and more frequently at the discretion of the provider when there has been a change in patient condition/clinical indication.   Screening  Date Last Completed   Advance Directives 7/17/2024     Annually during a primary care office visit and at the discretion of the provider when there has been a change in patient condition/clinical indication.  Depression Screening 6/10/2024   Suicide Risk Screening      Annually during any provider office visit and at the discretion of the provider when there has been a change in patient condition/clinical indication.  Alcohol Screening 4/21/2025 11:41 AM   Substance Use Screening 4/21/2025 11:41 AM    Tobacco 4/21/2025  1:13 PM     Completed at the discretion of the provider during any provider office visit or when there has been a change in patient condition/clinical indication.  Domestic Violence 7/17/2025   Human Trafficking    Spiritual/Cultural  7/17/2025   Food Insecurity Screening Social Determinants of Health  6/10/2024   Patient Education/Key Learner 7/17/2025   Pain Screening 7/17/2025     Completed per Adult Falls Prevention (Outpatient), CP-119  Falls Risk Screening            Time Calculation  Start Time: 1230  Stop Time: 1315  Time Calculation (min): 45 min    PT Therapeutic Procedures Time Entry  Therapeutic Exercise Time Entry: 30  Manual Therapy Time Entry: 15          Assessment: Patient presents with signs and symptoms consistent with Lumbar stenosis with DDD and Radicular pain, complex patient secondary to co morbidities, resulting in limited participation in pain-free ADLs and inability to perform at their prior level of function. The patient is experiencing increased lower extremity pain, resulting in greater dependence on her power chair for mobility possibly a MS flare up. During the session, she showed signs of fatigue while performing lower extremity and core strengthening exercises, needing occasional rest  breaks.      Complexity: high  Prognosis: fair  Response to care: fair  Clinical Presentation: Stable and/or uncomplicated characteristics  Personal Factors: Comorbidities    Problem List:  Pain  Function  Mobility  Strength  Plan:     Planned Interventions include: therapeutic exercise, self-care home management, manual therapy, therapeutic activities, gait training, neuromuscular coordination, vasopneumatic, dry needling, aquatic therapy    Next Treatment: Focus on Upper, Lower, Core strengthening, ROM, and functional endurance  Frequency: 1-2 x Week  Duration: 4 more week  Goals: Set and discussed today  4 weeks  Patient to have pain less than 5/10 for improved sit to stand ability- MET  Patient to be independent with HEP and progression for improved carryover- MET  Improved ability to perform walking 5 min with single point cane for improved mobility- PROGRESS  Improved tolerance with rere knee ext to 3+/5 for improved weightbearing stability and strength- PROGRESS    8 weeks  Patient to have improved JAISON score by 15 points for improved function at home- PROGRESS  Patient to have pain less than 4/10 for improved ADL performance up to 20 min- PROGRESS  Improved Walking 10 min for improved functional independence for better function with daily activities- PROGRESS  Patient to perform sit to stand 5 x in 30 seconds for improved lower extremity strength and transfers for motor chair-PROGRESS  Plan of care was developed with input and agreement by the patient  Insurance Authorization Information  Date of Evaluation: 25    Onset Date: 3/6/2025    Referring Physician: Homar Wheeler     Surgery in the Last 3 months:  no    CPT Codes: Therapeutic Exercise: 20154 Therapeutic Activity: 43367 Neuromuscular Re-Education: 86646 Manual Therapy: 76793 Gait Trainin Self-Care/Home Management Trainin Mechanical Traction: 23978 Electric Stimulation (Attended): 82671 Electric Stimulation (Unattended): 25925  Vasopneumatic Device: 24756 PT Re-Evaluation: 49448     Diagnosis:   Problem List Items Addressed This Visit           ICD-10-CM    Multiple sclerosis (Multi) G35    Lumbar radiculopathy, acute M54.16    Spinal stenosis of lumbar region with neurogenic claudication M48.062    Degeneration of intervertebral disc of lumbar region with discogenic back pain and lower extremity pain M51.362          Functional Outcome:       OT / PT Evaluation complexity:  high    Which of the following best describes the primary reason of therapy: Improving, restoring, or adapting functional mobility or skills    Visits Requested: 6    Date Range: 90 days    Select all conditions that apply: Arthritis Conditions     Re-evaluation only:  Short term goals Met: 2/4    Long term goals Met: 1/4    Richie Moreno, PT         Richie Moreno, PT

## 2025-08-02 ENCOUNTER — CLINICAL SUPPORT (OUTPATIENT)
Dept: SLEEP MEDICINE | Facility: HOSPITAL | Age: 60
End: 2025-08-02
Payer: MEDICARE

## 2025-08-02 DIAGNOSIS — I10 BENIGN ESSENTIAL HTN: ICD-10-CM

## 2025-08-02 DIAGNOSIS — R06.02 SHORTNESS OF BREATH ON EXERTION: ICD-10-CM

## 2025-08-02 DIAGNOSIS — R07.9 CHEST PAIN, UNSPECIFIED TYPE: ICD-10-CM

## 2025-08-02 DIAGNOSIS — I47.10 PAROXYSMAL SVT (SUPRAVENTRICULAR TACHYCARDIA): ICD-10-CM

## 2025-08-02 DIAGNOSIS — G47.33 OSA (OBSTRUCTIVE SLEEP APNEA): ICD-10-CM

## 2025-08-02 DIAGNOSIS — R79.89 TROPONIN LEVEL ELEVATED: ICD-10-CM

## 2025-08-02 DIAGNOSIS — R55 SYNCOPE AND COLLAPSE: ICD-10-CM

## 2025-08-02 DIAGNOSIS — R07.89 ATYPICAL CHEST PAIN: ICD-10-CM

## 2025-08-02 PROCEDURE — 95806 SLEEP STUDY UNATT&RESP EFFT: CPT | Performed by: GENERAL PRACTICE

## 2025-08-08 ENCOUNTER — TREATMENT (OUTPATIENT)
Dept: PHYSICAL THERAPY | Facility: CLINIC | Age: 60
End: 2025-08-08
Payer: MEDICARE

## 2025-08-08 DIAGNOSIS — M51.362 DEGENERATION OF INTERVERTEBRAL DISC OF LUMBAR REGION WITH DISCOGENIC BACK PAIN AND LOWER EXTREMITY PAIN: ICD-10-CM

## 2025-08-08 DIAGNOSIS — M54.16 LUMBAR RADICULOPATHY, ACUTE: ICD-10-CM

## 2025-08-08 DIAGNOSIS — G35 MULTIPLE SCLEROSIS (MULTI): ICD-10-CM

## 2025-08-08 DIAGNOSIS — M48.062 SPINAL STENOSIS OF LUMBAR REGION WITH NEUROGENIC CLAUDICATION: ICD-10-CM

## 2025-08-08 PROCEDURE — 97110 THERAPEUTIC EXERCISES: CPT | Mod: GP | Performed by: PHYSICAL THERAPIST

## 2025-08-08 PROCEDURE — 97140 MANUAL THERAPY 1/> REGIONS: CPT | Mod: GP | Performed by: PHYSICAL THERAPIST

## 2025-08-08 NOTE — PROGRESS NOTES
Physical Therapy  Physical Therapy Orthopedic Evaluation    Patient Name: Genny Grant  MRN: [id number]  Today's Date: 8/8/2025  Time Calculation  Start Time: 1217  Stop Time: 1303  Time Calculation (min): 46 min       PT Therapeutic Procedures Time Entry  Therapeutic Exercise Time Entry: 30  Manual Therapy Time Entry: 10       Insurance:  Visit number: 3  Authorization info: 4 visits  Insurance Type: Payor: [employer] / Plan: [employer] DUAL ADVANTAGE / Product Type: *No Product type* /      Current Problem     Problem List Items Addressed This Visit           ICD-10-CM    Multiple sclerosis (Multi) G35    Lumbar radiculopathy, acute M54.16    Spinal stenosis of lumbar region with neurogenic claudication M48.062    Degeneration of intervertebral disc of lumbar region with discogenic back pain and lower extremity pain M51.362         Surgery:    Referred by:   Homar Wheeler MD     Precautions:   MS, fibromyalgia, osteoporosis, RA, former CA, HBP, High cholesterol, heart palpitations, DWIGHT, neuropathy    Subjective:   Subjective - Back and L leg pain. L leg hurts 3-4 hours every day. Low back pain is more L sided as well    General:     Chief Complaint: Low back and leg pain  Onset: chronic   VARSHA: gradual worsening    Pain:     Location: Low back 4/10 this AM  7/18/25 bilateral leg pain 5/10    Relevant Information (PMH & Previous Tests/Imaging): MRI 2024 Lumbar spondylosis as described worst at L3-L4 with moderate canal  narrowing.  Small amount of edema in the left L4 and L5 pedicles, likely mechanical stress.     Prior Level of Function (PLOF)  Patient previously independent with all ADLs  Exercise/Physical Activity: walking and dancing  Work/School: Retired teacher     Patient's Goal(s) for Therapy: improve walking better with longer distance    Red Flags: Do you have any of the following? No  Fever/chills, unexplained weight changes, dizziness/fainting, unexplained change in bowel or bladder  "functions, unexplained malaise or muscle weakness, night pain/sweats, numbness or tingling    Objective:  Objective   JAISON 18/50  Palpation/Observation  TTP through lower R >L paraspinals  Posture  Presents in motor chair, no accentuated curvatures, rigid standing posture with single point cane   Special Testing  + SLR, asis compression and gap testing, sacral rock  ROM  8/8/2025  Trunk AROM  Flexion 25%  Ext 25%  L rot 25-50%  R rot 25%   All are painful  HS pop angle 40deg abdifatah  Hip flexion 100deg abdifatah  Abdifatah hip rotation IR and ER limited by 10deg, log roll limited by 10 deg abdifatah  No restrictions with hip ABD/ADD  Central PA glides show restriction withLower lumbar mobility and pain is present    Strength  8/8/2025  Abdifatah Knee ext3+ /5 and flex 4-/5 unable to tolerate resistance, abdifatah hip flex 3+/5, she cannot perform standing heel or calf raise, 3/5 seated heel and calf raise    5x sit to stand 25 second  Sensation  L>R radicular pain  Reflexes  1+ achilles patellar DTR, neg clonus    Transfers: slow, abdifatah UE assist, cane assist, mild guarding  Gait: uses motor chair today, she has been suing a cane in the clinic previously   DL Squat: sit to stand antalgic with UE assist, and decreased Lower extremity push, more of a pull/push with arms to stand      Treatments:  Ther-EX:  Swiss ball roll 2 x 10  Trunk rotation x 10 ea  Ppt with ball squeeze x 15 5\"  Bridge x 12  Seated UE flexion/trunk ext x 10  Sit to stand x 5 test  Seated chops, CW and CCW x 10 ea          supine marches 2 x 10   Bridges 3 x 10  Trunk rotation 2 x 20  Clam shell 2 x 10  Seated Pallof 2 x 10 with YB  Seated pallof [place] x 10 each side    Shoulder flexion YTB x 10  Chest press with YTB 2 x 10       Manual:  ISTM massage stick on It band and quad  Modalities:  Discussed ice and heat       PT Therapeutic Procedures Time Entry  Therapeutic Exercise Time Entry: 30  Manual Therapy Time Entry: 10       EDUCATION: Home exercise program, plan of care, " activity modifications, pain management, and injury pathology     Code: [id number]     Medical History Form: Reviewed (scanned into chart)  Reviewed medical form, [place], Falls risk, Latter-day beliefs, PHQ  Annually for patients age 55 and older and patients with life-threatening illness and more frequently at the discretion of the provider when there has been a change in patient condition/clinical indication.   Screening  Date Last Completed   Advance Directives 7/17/2024     Annually during a primary care office visit and at the discretion of the provider when there has been a change in patient condition/clinical indication.  Depression Screening 6/10/2024   Suicide Risk Screening      Annually during any provider office visit and at the discretion of the provider when there has been a change in patient condition/clinical indication.  Alcohol Screening 4/21/2025 11:41 AM   Substance Use Screening 4/21/2025 11:41 AM    Tobacco 4/21/2025  1:13 PM     Completed at the discretion of the provider during any provider office visit or when there has been a change in patient condition/clinical indication.  Domestic Violence 7/17/2025   Human Trafficking    Spiritual/Cultural  7/17/2025   Food Insecurity Screening Social Determinants of Health  6/10/2024   Patient Education/Key Learner 7/17/2025   Pain Screening 7/17/2025     Completed per Adult Falls Prevention (Outpatient), CP-119  Falls Risk Screening            Time Calculation  Start Time: 1217  Stop Time: 1303  Time Calculation (min): 46 min    PT Therapeutic Procedures Time Entry  Therapeutic Exercise Time Entry: 30  Manual Therapy Time Entry: 10          Assessment: Patient presents with signs and symptoms consistent with Lumbar stenosis with DDD and Radicular pain, complex patient secondary to co morbidities, she has made progress with pain, functional mobility and strength. She is limited with activities for lengthened amounts of time by her pre-existing  condition and co morbidities She would benefit from continued service      Complexity: high  Prognosis: fair  Response to care: fair  Clinical Presentation: Stable and/or uncomplicated characteristics  Personal Factors: Comorbidities    Problem List:  Pain  Function  Mobility  Strength  Plan:     Planned Interventions include: therapeutic exercise, self-care home management, manual therapy, therapeutic activities, gait training, neuromuscular coordination, vasopneumatic, dry needling, aquatic therapy    Next Treatment: Focus on Upper, Lower, Core strengthening, ROM, and functional endurance  Frequency: 1x  Duration: 6 more weeks  Goals: Set and discussed today  4 weeks  Patient to have pain less than 5/10 for improved sit to stand ability- MET  Patient to be independent with HEP and progression for improved carryover- MET  Improved ability to perform walking 5 min with single point cane for improved mobility- MET  Improved tolerance with rere knee ext to 3+/5 for improved weightbearing stability and strength- PROGRESS  Patient able to perform sit to stand without UE assist x 5- NEW  Patient to perform 10 SLR for improved hip flex strength- NEW    8 weeks  Patient to have improved JAISON score by 15 points for improved function at home- PROGRESS  Patient to have pain less than 4/10 for improved ADL performance up to 20 min- PROGRESS  Improved Walking 10 min for improved functional independence for better function with daily activities- MET  Patient to perform sit to stand 5 x in 30 seconds for improved lower extremity strength and transfers for motor chair-MET\  POAtient able to ambulate 1/2 miles for improved health and fitness   Plan of care was developed with input and agreement by the patient  Insurance Authorization Information  Date of Evaluation: 4/1/25    Onset Date: 3/6/2025    Referring Physician: Homar Wheeler     Surgery in the Last 3 months:  no    CPT Codes: Therapeutic Exercise: 25227 Therapeutic  Activity: 82014 Neuromuscular Re-Education: 81436 Manual Therapy: 13445 Gait Trainin Self-Care/Home Management Trainin Mechanical Traction: 02709 Electric Stimulation (Attended): 53713 Electric Stimulation (Unattended): 20670 Vasopneumatic Device: 05924 PT Re-Evaluation: 22692     Diagnosis:   Problem List Items Addressed This Visit           ICD-10-CM    Multiple sclerosis (Multi) G35    Lumbar radiculopathy, acute M54.16    Spinal stenosis of lumbar region with neurogenic claudication M48.062    Degeneration of intervertebral disc of lumbar region with discogenic back pain and lower extremity pain M51.362          Functional Outcome:   JAISON 1850    OT / PT Evaluation complexity:  high    Which of the following best describes the primary reason of therapy: Improving, restoring, or adapting functional mobility or skills    Visits Requested: 6    Date Range: 90 days    Select all conditions that apply: Arthritis Conditions     Re-evaluation only:  Short term goals Met: 3/6    Long term goals Met:     Richie Moreno, PT         Richie Moreno, PT

## 2025-08-11 ENCOUNTER — OFFICE VISIT (OUTPATIENT)
Dept: DERMATOLOGY | Facility: HOSPITAL | Age: 60
End: 2025-08-11
Payer: MEDICARE

## 2025-08-11 DIAGNOSIS — L73.1 PSEUDOFOLLICULITIS BARBAE: ICD-10-CM

## 2025-08-11 DIAGNOSIS — L20.89 FLEXURAL ATOPIC DERMATITIS: ICD-10-CM

## 2025-08-11 DIAGNOSIS — L65.9 ALOPECIA: ICD-10-CM

## 2025-08-11 DIAGNOSIS — L21.9 SEBORRHEIC DERMATITIS: Primary | ICD-10-CM

## 2025-08-11 PROCEDURE — 1036F TOBACCO NON-USER: CPT | Performed by: DERMATOLOGY

## 2025-08-11 PROCEDURE — 99214 OFFICE O/P EST MOD 30 MIN: CPT | Performed by: DERMATOLOGY

## 2025-08-11 RX ORDER — CLINDAMYCIN PHOSPHATE 10 UG/ML
LOTION TOPICAL 2 TIMES DAILY
Qty: 60 ML | Refills: 3 | Status: SHIPPED | OUTPATIENT
Start: 2025-08-11 | End: 2026-08-11

## 2025-08-11 RX ORDER — BIMATOPROST 3 UG/ML
SOLUTION TOPICAL
Qty: 5 ML | Refills: 3 | Status: SHIPPED | OUTPATIENT
Start: 2025-08-11

## 2025-08-11 ASSESSMENT — PATIENT GLOBAL ASSESSMENT (PGA): PATIENT GLOBAL ASSESSMENT: PATIENT GLOBAL ASSESSMENT:  1 - CLEAR

## 2025-08-11 ASSESSMENT — DERMATOLOGY PATIENT ASSESSMENT
HAVE YOU HAD OR DO YOU HAVE VASCULAR DISEASE: NO
ARE YOU TRYING TO GET PREGNANT: NO
ARE YOU AN ORGAN TRANSPLANT RECIPIENT: NO
DO YOU HAVE IRREGULAR MENSTRUAL CYCLES: NO
DO YOU HAVE ANY NEW OR CHANGING LESIONS: NO
DO YOU USE A TANNING BED: NO
HAVE YOU HAD OR DO YOU HAVE A STAPH INFECTION: NO
ARE YOU ON BIRTH CONTROL: NO

## 2025-08-11 ASSESSMENT — DERMATOLOGY QUALITY OF LIFE (QOL) ASSESSMENT
DATE THE QUALITY-OF-LIFE ASSESSMENT WAS COMPLETED: 67428
ARE THERE EXCLUSIONS OR EXCEPTIONS FOR THE QUALITY OF LIFE ASSESSMENT: NO
RATE HOW EMOTIONALLY BOTHERED YOU ARE BY YOUR SKIN PROBLEM (FOR EXAMPLE, WORRY, EMBARRASSMENT, FRUSTRATION): 0 - NEVER BOTHERED
RATE HOW BOTHERED YOU ARE BY EFFECTS OF YOUR SKIN PROBLEMS ON YOUR ACTIVITIES (EG, GOING OUT, ACCOMPLISHING WHAT YOU WANT, WORK ACTIVITIES OR YOUR RELATIONSHIPS WITH OTHERS): 0 - NEVER BOTHERED
RATE HOW BOTHERED YOU ARE BY SYMPTOMS OF YOUR SKIN PROBLEM (EG, ITCHING, STINGING BURNING, HURTING OR SKIN IRRITATION): 0 - NEVER BOTHERED

## 2025-08-11 ASSESSMENT — ITCH NUMERIC RATING SCALE: HOW SEVERE IS YOUR ITCHING?: 7

## 2025-08-27 DIAGNOSIS — G47.33 OSA (OBSTRUCTIVE SLEEP APNEA): Primary | ICD-10-CM

## 2025-08-28 ENCOUNTER — RESULTS FOLLOW-UP (OUTPATIENT)
Dept: CARDIOLOGY | Facility: CLINIC | Age: 60
End: 2025-08-28
Payer: MEDICARE

## 2025-10-31 ENCOUNTER — APPOINTMENT (OUTPATIENT)
Dept: NEUROLOGY | Facility: CLINIC | Age: 60
End: 2025-10-31
Payer: MEDICARE